# Patient Record
Sex: MALE | Race: WHITE | NOT HISPANIC OR LATINO | Employment: OTHER | URBAN - METROPOLITAN AREA
[De-identification: names, ages, dates, MRNs, and addresses within clinical notes are randomized per-mention and may not be internally consistent; named-entity substitution may affect disease eponyms.]

---

## 2017-07-05 ENCOUNTER — APPOINTMENT (OUTPATIENT)
Dept: RADIOLOGY | Facility: MEDICAL CENTER | Age: 77
End: 2017-07-05
Attending: EMERGENCY MEDICINE
Payer: MEDICARE

## 2017-07-05 ENCOUNTER — HOSPITAL ENCOUNTER (OUTPATIENT)
Facility: MEDICAL CENTER | Age: 77
End: 2017-07-06
Attending: EMERGENCY MEDICINE | Admitting: HOSPITALIST
Payer: MEDICARE

## 2017-07-05 DIAGNOSIS — R07.9 CHEST PAIN, UNSPECIFIED TYPE: ICD-10-CM

## 2017-07-05 LAB
ALBUMIN SERPL BCP-MCNC: 3.4 G/DL (ref 3.2–4.9)
ALBUMIN/GLOB SERPL: 1.2 G/DL
ALP SERPL-CCNC: 86 U/L (ref 30–99)
ALT SERPL-CCNC: 5 U/L (ref 2–50)
ANION GAP SERPL CALC-SCNC: 6 MMOL/L (ref 0–11.9)
APTT PPP: 29.3 SEC (ref 24.7–36)
AST SERPL-CCNC: 14 U/L (ref 12–45)
BASOPHILS # BLD AUTO: 0.3 % (ref 0–1.8)
BASOPHILS # BLD: 0.02 K/UL (ref 0–0.12)
BILIRUB SERPL-MCNC: 0.3 MG/DL (ref 0.1–1.5)
BNP SERPL-MCNC: 58 PG/ML (ref 0–100)
BUN SERPL-MCNC: 23 MG/DL (ref 8–22)
CALCIUM SERPL-MCNC: 8.4 MG/DL (ref 8.5–10.5)
CHLORIDE SERPL-SCNC: 110 MMOL/L (ref 96–112)
CO2 SERPL-SCNC: 20 MMOL/L (ref 20–33)
CREAT SERPL-MCNC: 1.34 MG/DL (ref 0.5–1.4)
EKG IMPRESSION: NORMAL
EOSINOPHIL # BLD AUTO: 0.52 K/UL (ref 0–0.51)
EOSINOPHIL NFR BLD: 8.2 % (ref 0–6.9)
ERYTHROCYTE [DISTWIDTH] IN BLOOD BY AUTOMATED COUNT: 47.9 FL (ref 35.9–50)
GFR SERPL CREATININE-BSD FRML MDRD: 52 ML/MIN/1.73 M 2
GLOBULIN SER CALC-MCNC: 2.8 G/DL (ref 1.9–3.5)
GLUCOSE SERPL-MCNC: 87 MG/DL (ref 65–99)
HCT VFR BLD AUTO: 33.3 % (ref 42–52)
HGB BLD-MCNC: 11.4 G/DL (ref 14–18)
IMM GRANULOCYTES # BLD AUTO: 0.03 K/UL (ref 0–0.11)
IMM GRANULOCYTES NFR BLD AUTO: 0.5 % (ref 0–0.9)
INR PPP: 1.3 (ref 0.87–1.13)
LIPASE SERPL-CCNC: 27 U/L (ref 11–82)
LYMPHOCYTES # BLD AUTO: 2.55 K/UL (ref 1–4.8)
LYMPHOCYTES NFR BLD: 40.1 % (ref 22–41)
MCH RBC QN AUTO: 32.9 PG (ref 27–33)
MCHC RBC AUTO-ENTMCNC: 34.2 G/DL (ref 33.7–35.3)
MCV RBC AUTO: 96 FL (ref 81.4–97.8)
MONOCYTES # BLD AUTO: 0.71 K/UL (ref 0–0.85)
MONOCYTES NFR BLD AUTO: 11.2 % (ref 0–13.4)
NEUTROPHILS # BLD AUTO: 2.53 K/UL (ref 1.82–7.42)
NEUTROPHILS NFR BLD: 39.7 % (ref 44–72)
NRBC # BLD AUTO: 0 K/UL
NRBC BLD AUTO-RTO: 0 /100 WBC
PLATELET # BLD AUTO: 135 K/UL (ref 164–446)
PMV BLD AUTO: 10.7 FL (ref 9–12.9)
POTASSIUM SERPL-SCNC: 4.1 MMOL/L (ref 3.6–5.5)
PROT SERPL-MCNC: 6.2 G/DL (ref 6–8.2)
PROTHROMBIN TIME: 16.6 SEC (ref 12–14.6)
RBC # BLD AUTO: 3.47 M/UL (ref 4.7–6.1)
SODIUM SERPL-SCNC: 136 MMOL/L (ref 135–145)
TROPONIN I SERPL-MCNC: <0.01 NG/ML (ref 0–0.04)
WBC # BLD AUTO: 6.4 K/UL (ref 4.8–10.8)

## 2017-07-05 PROCEDURE — 85025 COMPLETE CBC W/AUTO DIFF WBC: CPT

## 2017-07-05 PROCEDURE — 83880 ASSAY OF NATRIURETIC PEPTIDE: CPT

## 2017-07-05 PROCEDURE — 84484 ASSAY OF TROPONIN QUANT: CPT

## 2017-07-05 PROCEDURE — 99285 EMERGENCY DEPT VISIT HI MDM: CPT

## 2017-07-05 PROCEDURE — 71010 DX-CHEST-LIMITED (1 VIEW): CPT

## 2017-07-05 PROCEDURE — 80053 COMPREHEN METABOLIC PANEL: CPT

## 2017-07-05 PROCEDURE — 36415 COLL VENOUS BLD VENIPUNCTURE: CPT

## 2017-07-05 PROCEDURE — 85730 THROMBOPLASTIN TIME PARTIAL: CPT

## 2017-07-05 PROCEDURE — 83690 ASSAY OF LIPASE: CPT

## 2017-07-05 PROCEDURE — 93005 ELECTROCARDIOGRAM TRACING: CPT

## 2017-07-05 PROCEDURE — 85610 PROTHROMBIN TIME: CPT

## 2017-07-05 ASSESSMENT — LIFESTYLE VARIABLES: DO YOU DRINK ALCOHOL: NO

## 2017-07-05 ASSESSMENT — PAIN SCALES - GENERAL: PAINLEVEL_OUTOF10: 3

## 2017-07-05 NOTE — IP AVS SNAPSHOT
" Home Care Instructions                                                                                                                  Name:Naif Medina  Medical Record Number:[id number]  CSN: [id number]    YOB: 1940   Age: 76 y.o.  Sex: male  HT:1.778 m (5' 10\") WT: 65.1 kg (143 lb 8.3 oz)          Admit Date: 7/5/2017     Discharge Date:   Today's Date: 7/6/2017  Attending Doctor:  Dieter Bryan M.D.                  Allergies:  Heparin            Discharge Instructions       Discharge Instructions    Discharged to home by medical transportation with self. Discharged via wheelchair, hospital escort: Yes.  Special equipment needed: Not Applicable    Be sure to schedule a follow-up appointment with your primary care doctor or any specialists as instructed.     Discharge Plan:   Diet Plan: Discussed  Activity Level: Discussed  Smoking Cessation Offered: Patient Refused  Confirmed Follow up Appointment: Patient to Call and Schedule Appointment  Confirmed Symptoms Management: Discussed  Medication Reconciliation Updated: Yes  Influenza Vaccine Indication: Not indicated: Previously immunized this influenza season and > 8 years of age    I understand that a diet low in cholesterol, fat, and sodium is recommended for good health. Unless I have been given specific instructions below for another diet, I accept this instruction as my diet prescription.   Other diet:     Special Instructions: None    · Is patient discharged on Warfarin / Coumadin?   No     · Is patient Post Blood Transfusion?  No    Depression / Suicide Risk    As you are discharged from this [facility], it is important to learn how to keep safe from harming yourself.    Recognize the warning signs:  · Abrupt changes in personality, positive or negative- including increase in energy   · Giving away possessions  · Change in eating patterns- significant weight changes-  positive or negative  · Change in sleeping patterns- unable to " sleep or sleeping all the time   · Unwillingness or inability to communicate  · Depression  · Unusual sadness, discouragement and loneliness  · Talk of wanting to die  · Neglect of personal appearance   · Rebelliousness- reckless behavior  · Withdrawal from people/activities they love  · Confusion- inability to concentrate     If you or a loved one observes any of these behaviors or has concerns about self-harm, here's what you can do:  · Talk about it- your feelings and reasons for harming yourself  · Remove any means that you might use to hurt yourself (examples: pills, rope, extension cords, firearm)  · Get professional help from the community (Mental Health, Substance Abuse, psychological counseling)  · Do not be alone:Call your Safe Contact- someone whom you trust who will be there for you.  · Call your local CRISIS HOTLINE 111-8529 or 081-582-9668  · Call your local Children's Mobile Crisis Response Team [place] (419) 840-0487 or www.[employer]  · Call the toll free National Suicide Prevention Hotlines   · National Suicide Prevention Lifeline 054-195-MZLA (9603)  · National Hope Line Network 800-SUICIDE (211-9920)        Follow-up Information     1. Schedule an appointment as soon as possible for a visit with John Hilario M.D..    Specialty:  Internal Medicine    Contact information    [address] [place] [address] [phone]          2. [facility] Schedule an appointment as soon as possible for a visit in 1 week.         Discharge Medication Instructions:    Below are the medications your physician expects you to take upon discharge:    Review all your home medications and newly ordered medications with your doctor and/or pharmacist. Follow medication instructions as directed by your doctor and/or pharmacist.    Please keep your medication list with you and share with your physician.               Medication List      START taking these medications        Instructions    Morning  Afternoon Evening Bedtime    isosorbide mononitrate SR 30 MG Tb24   Commonly known as:  IMDUR        Take 1 Tab by mouth every morning.   Dose:  30 mg                          CONTINUE taking these medications        Instructions    Morning Afternoon Evening Bedtime    albuterol 108 (90 BASE) MCG/ACT Aers inhalation aerosol        Inhale 2 Puffs by mouth every 6 hours as needed for Shortness of Breath.   Dose:  2 Puff                        aspirin EC 81 MG Tbec   Last time this was given:  81 mg on 7/6/2017  9:18 AM   Commonly known as:  ECOTRIN        Take 81 mg by mouth every morning.   Dose:  81 mg                        busPIRone 5 MG Tabs   Last time this was given:  5 mg on 7/6/2017  9:18 AM   Commonly known as:  BUSPAR        Take 5 mg by mouth 2 Times a Day.   Dose:  5 mg                        carvedilol 6.25 MG Tabs   Last time this was given:  6.25 mg on 7/6/2017  9:18 AM   Commonly known as:  COREG        Take 6.25 mg by mouth 2 times a day, with meals.   Dose:  6.25 mg                        ibuprofen 400 MG Tabs   Commonly known as:  MOTRIN        Take 400 mg by mouth every morning.   Dose:  400 mg                        Iron 325 (65 FE) MG Tabs        Take 1 Tab by mouth 2 Times a Day.   Dose:  1 Tab                        lisinopril 20 MG Tabs   Last time this was given:  20 mg on 7/6/2017  9:18 AM   Commonly known as:  PRINIVIL        Take 20 mg by mouth 2 times a day.   Dose:  20 mg                        Magnesium Oxide 420 MG Tabs        Take 1 Tab by mouth every evening.   Dose:  1 Tab                        omeprazole 40 MG delayed-release capsule   Last time this was given:  40 mg on 7/6/2017  9:20 AM   Commonly known as:  PRILOSEC        Take 40 mg by mouth every morning.   Dose:  40 mg                        simvastatin 20 MG Tabs   Commonly known as:  ZOCOR        Take 20 mg by mouth every evening.   Dose:  20 mg                        therapeutic multivitamin-minerals Tabs        Take 1  Tab by mouth every day.   Dose:  1 Tab                             Where to Get Your Medications      You can get these medications from any pharmacy     Bring a paper prescription for each of these medications    - isosorbide mononitrate SR 30 MG Tb24            Instructions           Diet / Nutrition:    Follow any diet instructions given to you by your doctor or the dietician, including how much salt (sodium) you are allowed each day.    If you are overweight, talk to your doctor about a weight reduction plan.    Activity:    Remain physically active following your doctor's instructions about exercise and activity.    Rest often.     Any time you become even a little tired or short of breath, SIT DOWN and rest.    Worsening Symptoms:    Report any of the following signs and symptoms to the doctor's office immediately:    *Pain of jaw, arm, or neck  *Chest pain not relieved by medication                               *Dizziness or loss of consciousness  *Difficulty breathing even when at rest   *More tired than usual                                       *Bleeding drainage or swelling of surgical site  *Swelling of feet, ankles, legs or stomach                 *Fever (>100ºF)  *Pink or blood tinged sputum  *Weight gain (3lbs/day or 5lbs /week)           *Shock from internal defibrillator (if applicable)  *Palpitations or irregular heartbeats                *Cool and/or numb extremities    Stroke Awareness    Common Risk Factors for Stroke include:    Age  Atrial Fibrillation  Carotid Artery Stenosis  Diabetes Mellitus  Excessive alcohol consumption  High blood pressure  Overweight   Physical inactivity  Smoking    Warning signs and symptoms of a stroke include:    *Sudden numbness or weakness of the face, arm or leg (especially on one side of the body).  *Sudden confusion, trouble speaking or understanding.  *Sudden trouble seeing in one or both eyes.  *Sudden trouble walking, dizziness, loss of balance or  coordination.Sudden severe headache with no known cause.    It is very important to get treatment quickly when a stroke occurs. If you experience any of the above warning signs, call 911 immediately.                   Disclaimer         Quit Smoking / Tobacco Use:    I understand the use of any tobacco products increases my chance of suffering from future heart disease or stroke and could cause other illnesses which may shorten my life. Quitting the use of tobacco products is the single most important thing I can do to improve my health. For further information on smoking / tobacco cessation call a Toll Free Quit Line at 1-645.421.2308 (*National Cancer [place]) or 1-924.502.6751 (American Lung Association) or you can access the web based program at www.[employer]org.    [place] Tobacco Users Help Line:  (457) 284-6625       Toll Free: 1-129.515.9616  Quit Tobacco Program [facility] (481)998-5611    Crisis Hotline:    [place] Crisis Hotline:  1-759-KCOLWJU or 1-450.307.7796    [place] Crisis Hotline:    1-389.408.3333 or 498-599-1705    Discharge Survey:   Thank you for choosing [facility]. We hope we did everything we could to make your hospital stay a pleasant one. You may be receiving a phone survey and we would appreciate your time and participation in answering the questions. Your input is very valuable to us in our efforts to improve our service to our patients and their families.        My signature on this form indicates that:    1. I have reviewed and understand the above information.  2. My questions regarding this information have been answered to my satisfaction.  3. I have formulated a plan with my discharge nurse to obtain my prescribed medications for home.                  Disclaimer         __________________________________                     __________       ________                       Patient Signature                                                 Date                     Time

## 2017-07-06 ENCOUNTER — PATIENT OUTREACH (OUTPATIENT)
Dept: HEALTH INFORMATION MANAGEMENT | Facility: OTHER | Age: 77
End: 2017-07-06

## 2017-07-06 ENCOUNTER — RESOLUTE PROFESSIONAL BILLING HOSPITAL PROF FEE (OUTPATIENT)
Dept: HOSPITALIST | Facility: MEDICAL CENTER | Age: 77
End: 2017-07-06
Payer: MEDICARE

## 2017-07-06 VITALS
TEMPERATURE: 96.6 F | DIASTOLIC BLOOD PRESSURE: 62 MMHG | BODY MASS INDEX: 20.55 KG/M2 | HEART RATE: 53 BPM | WEIGHT: 143.52 LBS | RESPIRATION RATE: 16 BRPM | OXYGEN SATURATION: 97 % | HEIGHT: 70 IN | SYSTOLIC BLOOD PRESSURE: 149 MMHG

## 2017-07-06 PROBLEM — R07.9 CHEST PAIN: Status: ACTIVE | Noted: 2017-07-06

## 2017-07-06 LAB
ANION GAP SERPL CALC-SCNC: 6 MMOL/L (ref 0–11.9)
BUN SERPL-MCNC: 23 MG/DL (ref 8–22)
CALCIUM SERPL-MCNC: 8.4 MG/DL (ref 8.5–10.5)
CHLORIDE SERPL-SCNC: 111 MMOL/L (ref 96–112)
CO2 SERPL-SCNC: 19 MMOL/L (ref 20–33)
CREAT SERPL-MCNC: 1.29 MG/DL (ref 0.5–1.4)
EKG IMPRESSION: NORMAL
EKG IMPRESSION: NORMAL
GFR SERPL CREATININE-BSD FRML MDRD: 54 ML/MIN/1.73 M 2
GLUCOSE SERPL-MCNC: 86 MG/DL (ref 65–99)
POTASSIUM SERPL-SCNC: 4.7 MMOL/L (ref 3.6–5.5)
SODIUM SERPL-SCNC: 136 MMOL/L (ref 135–145)
TROPONIN I SERPL-MCNC: <0.01 NG/ML (ref 0–0.04)
TROPONIN I SERPL-MCNC: <0.01 NG/ML (ref 0–0.04)

## 2017-07-06 PROCEDURE — 700102 HCHG RX REV CODE 250 W/ 637 OVERRIDE(OP): Performed by: HOSPITALIST

## 2017-07-06 PROCEDURE — 700111 HCHG RX REV CODE 636 W/ 250 OVERRIDE (IP): Performed by: HOSPITALIST

## 2017-07-06 PROCEDURE — 93010 ELECTROCARDIOGRAM REPORT: CPT | Mod: 76 | Performed by: INTERNAL MEDICINE

## 2017-07-06 PROCEDURE — G0378 HOSPITAL OBSERVATION PER HR: HCPCS

## 2017-07-06 PROCEDURE — 84484 ASSAY OF TROPONIN QUANT: CPT

## 2017-07-06 PROCEDURE — 93005 ELECTROCARDIOGRAM TRACING: CPT | Performed by: HOSPITALIST

## 2017-07-06 PROCEDURE — A9270 NON-COVERED ITEM OR SERVICE: HCPCS | Performed by: HOSPITALIST

## 2017-07-06 PROCEDURE — 96372 THER/PROPH/DIAG INJ SC/IM: CPT

## 2017-07-06 PROCEDURE — 99235 HOSP IP/OBS SAME DATE MOD 70: CPT | Performed by: HOSPITALIST

## 2017-07-06 PROCEDURE — A9270 NON-COVERED ITEM OR SERVICE: HCPCS | Performed by: NURSE PRACTITIONER

## 2017-07-06 PROCEDURE — 36415 COLL VENOUS BLD VENIPUNCTURE: CPT

## 2017-07-06 PROCEDURE — 80048 BASIC METABOLIC PNL TOTAL CA: CPT

## 2017-07-06 PROCEDURE — 700102 HCHG RX REV CODE 250 W/ 637 OVERRIDE(OP): Performed by: NURSE PRACTITIONER

## 2017-07-06 RX ORDER — BISACODYL 10 MG
10 SUPPOSITORY, RECTAL RECTAL
Status: DISCONTINUED | OUTPATIENT
Start: 2017-07-06 | End: 2017-07-06 | Stop reason: HOSPADM

## 2017-07-06 RX ORDER — RANOLAZINE 500 MG/1
500 TABLET, EXTENDED RELEASE ORAL 2 TIMES DAILY
Status: DISCONTINUED | OUTPATIENT
Start: 2017-07-06 | End: 2017-07-06 | Stop reason: HOSPADM

## 2017-07-06 RX ORDER — ONDANSETRON 4 MG/1
4 TABLET, ORALLY DISINTEGRATING ORAL EVERY 4 HOURS PRN
Status: DISCONTINUED | OUTPATIENT
Start: 2017-07-06 | End: 2017-07-06 | Stop reason: HOSPADM

## 2017-07-06 RX ORDER — SIMVASTATIN 40 MG
20 TABLET ORAL NIGHTLY
Status: DISCONTINUED | OUTPATIENT
Start: 2017-07-06 | End: 2017-07-06

## 2017-07-06 RX ORDER — AMOXICILLIN 250 MG
2 CAPSULE ORAL 2 TIMES DAILY
Status: DISCONTINUED | OUTPATIENT
Start: 2017-07-06 | End: 2017-07-06 | Stop reason: HOSPADM

## 2017-07-06 RX ORDER — POLYETHYLENE GLYCOL 3350 17 G/17G
1 POWDER, FOR SOLUTION ORAL
Status: DISCONTINUED | OUTPATIENT
Start: 2017-07-06 | End: 2017-07-06 | Stop reason: HOSPADM

## 2017-07-06 RX ORDER — LABETALOL HYDROCHLORIDE 5 MG/ML
10 INJECTION, SOLUTION INTRAVENOUS EVERY 4 HOURS PRN
Status: DISCONTINUED | OUTPATIENT
Start: 2017-07-06 | End: 2017-07-06 | Stop reason: HOSPADM

## 2017-07-06 RX ORDER — DIPHENHYDRAMINE HCL 25 MG
50 CAPSULE ORAL NIGHTLY PRN
Qty: 60 CAP | Refills: 1 | Status: SHIPPED | OUTPATIENT
Start: 2017-07-06 | End: 2022-01-01

## 2017-07-06 RX ORDER — ISOSORBIDE MONONITRATE 30 MG/1
30 TABLET, EXTENDED RELEASE ORAL EVERY MORNING
Qty: 30 TAB | Refills: 3 | Status: SHIPPED | OUTPATIENT
Start: 2017-07-06 | End: 2022-01-01

## 2017-07-06 RX ORDER — OMEPRAZOLE 20 MG/1
40 CAPSULE, DELAYED RELEASE ORAL EVERY MORNING
Status: DISCONTINUED | OUTPATIENT
Start: 2017-07-06 | End: 2017-07-06 | Stop reason: HOSPADM

## 2017-07-06 RX ORDER — ACETAMINOPHEN 325 MG/1
650 TABLET ORAL EVERY 6 HOURS PRN
Status: DISCONTINUED | OUTPATIENT
Start: 2017-07-06 | End: 2017-07-06 | Stop reason: HOSPADM

## 2017-07-06 RX ORDER — SIMVASTATIN 20 MG
20 TABLET ORAL NIGHTLY
Status: DISCONTINUED | OUTPATIENT
Start: 2017-07-06 | End: 2017-07-06 | Stop reason: HOSPADM

## 2017-07-06 RX ORDER — ONDANSETRON 2 MG/ML
4 INJECTION INTRAMUSCULAR; INTRAVENOUS EVERY 4 HOURS PRN
Status: DISCONTINUED | OUTPATIENT
Start: 2017-07-06 | End: 2017-07-06 | Stop reason: HOSPADM

## 2017-07-06 RX ORDER — LISINOPRIL 20 MG/1
20 TABLET ORAL 2 TIMES DAILY
Status: DISCONTINUED | OUTPATIENT
Start: 2017-07-06 | End: 2017-07-06 | Stop reason: HOSPADM

## 2017-07-06 RX ORDER — PNV NO.95/FERROUS FUM/FOLIC AC 28MG-0.8MG
1 TABLET ORAL 2 TIMES DAILY
Status: SHIPPED | COMMUNITY
End: 2022-01-01

## 2017-07-06 RX ORDER — BUSPIRONE HYDROCHLORIDE 5 MG/1
5 TABLET ORAL 2 TIMES DAILY
Status: DISCONTINUED | OUTPATIENT
Start: 2017-07-06 | End: 2017-07-06 | Stop reason: HOSPADM

## 2017-07-06 RX ORDER — CARVEDILOL 6.25 MG/1
6.25 TABLET ORAL 2 TIMES DAILY WITH MEALS
Status: DISCONTINUED | OUTPATIENT
Start: 2017-07-06 | End: 2017-07-06 | Stop reason: HOSPADM

## 2017-07-06 RX ADMIN — RANOLAZINE 500 MG: 500 TABLET, FILM COATED, EXTENDED RELEASE ORAL at 12:07

## 2017-07-06 RX ADMIN — CARVEDILOL 6.25 MG: 6.25 TABLET, FILM COATED ORAL at 09:18

## 2017-07-06 RX ADMIN — ASPIRIN 81 MG: 81 TABLET ORAL at 09:18

## 2017-07-06 RX ADMIN — ENOXAPARIN SODIUM 40 MG: 100 INJECTION SUBCUTANEOUS at 09:19

## 2017-07-06 RX ADMIN — LISINOPRIL 20 MG: 20 TABLET ORAL at 09:18

## 2017-07-06 RX ADMIN — BUSPIRONE HYDROCHLORIDE 5 MG: 5 TABLET ORAL at 09:18

## 2017-07-06 RX ADMIN — OMEPRAZOLE 40 MG: 20 CAPSULE, DELAYED RELEASE ORAL at 09:20

## 2017-07-06 ASSESSMENT — ENCOUNTER SYMPTOMS
COUGH: 0
CONSTIPATION: 0
NAUSEA: 0
WEAKNESS: 0
DIZZINESS: 0
HEADACHES: 0
MYALGIAS: 0
SHORTNESS OF BREATH: 0
VOMITING: 0
FEVER: 0
PALPITATIONS: 0
CHILLS: 0
ABDOMINAL PAIN: 0
DIARRHEA: 0
FOCAL WEAKNESS: 0

## 2017-07-06 ASSESSMENT — LIFESTYLE VARIABLES
EVER_SMOKED: YES
ALCOHOL_USE: NO

## 2017-07-06 ASSESSMENT — PAIN SCALES - GENERAL
PAINLEVEL_OUTOF10: 0
PAINLEVEL_OUTOF10: 0

## 2017-07-06 NOTE — CARE PLAN
Problem: Safety  Goal: Free from accidental injury  Outcome: PROGRESSING AS EXPECTED  Pt calls for needs before getting OOB. Bed alarm on.    Problem: Pain  Goal: Alleviation of Pain or a reduction in pain to the patient’s comfort goal  Outcome: PROGRESSING AS EXPECTED  Pt has had no c/o CP since admit to floor.

## 2017-07-06 NOTE — H&P
" Hospital Medicine History and Physical    Date of Service  7/6/2017    Chief Complaint  Chief Complaint   Patient presents with   • Chest Pain       History of Presenting Illness  76 y.o. male admitted 7/5/2017 with chest pain that he describes as \"a round thing in my chest\" and he is really able to give no better history than this. Apparently it started this morning around 5:30, then he took a nitro and went back to sleep with no apparent improvement; this did not stop him, however, from cleaning his house this afternoon and no having any chest pain then. He had a recurrence around 6 pm, took a nitro without relief and decided to call 911.    [facility] but he is not sure when he last saw them.    He had a normal stress test here in 2015 and a prior cardiology consultation in 2013 here with no ability to intervene for PCI due to tortuosity and at that time medical management was recommended.     Primary Care Physician  John Hilario M.D.    [facility]    Code Status  Full    Review of Systems  Review of Systems   Constitutional: Negative for fever and chills.   Respiratory: Negative for cough and shortness of breath.    Cardiovascular: Positive for chest pain. Negative for palpitations.   Gastrointestinal: Negative for nausea, vomiting, abdominal pain, diarrhea and constipation.   Genitourinary: Negative for dysuria.   Musculoskeletal: Negative for myalgias.   Skin: Negative for itching.   Neurological: Negative for dizziness, focal weakness, weakness and headaches.   All other systems reviewed and are negative.       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 at ENDOSCOPY [facility]S   • Colonoscopy - endo  3/22/2013     Performed by Edson Mccoy M.D. at ENDOSCOPY [facility] ORS   • Cardiac cath     • Angioplasty         Medications  No current facility-administered medications on file prior to encounter.     Current Outpatient Prescriptions on File Prior to Encounter   Medication Sig Dispense Refill   • albuterol (VENTOLIN OR PROVENTIL) 108 (90 BASE) MCG/ACT Aero Soln inhalation aerosol Inhale 2 Puffs by mouth every 6 hours as needed for Shortness of Breath.     • aspirin EC (ECOTRIN) 81 MG Tablet Delayed Response Take 81 mg by mouth every morning.     • busPIRone (BUSPAR) 5 MG Tab Take 5 mg by mouth 2 Times a Day.     • carvedilol (COREG) 6.25 MG Tab Take 6.25 mg by mouth 2 times a day, with meals.     • ibuprofen (MOTRIN) 400 MG Tab Take 400 mg by mouth every morning.     • lisinopril (PRINIVIL) 20 MG Tab Take 20 mg by mouth 2 times a day.     • Magnesium Oxide 420 MG Tab Take 1 Tab by mouth every evening.     • therapeutic multivitamin-minerals (THERAGRAN-M) Tab Take 1 Tab by mouth every day.     • omeprazole (PRILOSEC) 40 MG delayed-release capsule Take 40 mg by mouth every morning.     • simvastatin (ZOCOR) 20 MG Tab Take 20 mg by mouth every evening.         Family History  Family History   Problem Relation Age of Onset   • Heart Attack Father        Social History  Social History   Substance Use Topics   • Smoking status: Current Every Day Smoker -- 1.00 packs/day     Types: Cigarettes   • Smokeless tobacco: Not on file   • Alcohol Use: Yes      Comment: \"beer occasionally during sports\"       Allergies  Allergies   Allergen Reactions   • Heparin Unspecified     [facility]ical=[facility]; pt confirmed but can't recall the rxn.        Physical Exam  Laboratory/Imaging   Hemodynamics  Temp (24hrs), Av.9 °C " (98.4 °F), Min:36.9 °C (98.4 °F), Max:36.9 °C (98.4 °F)   Temperature: 36.9 °C (98.4 °F)  Pulse  Av.7  Min: 51  Max: 58 Heart Rate (Monitored): (!) 56  Blood Pressure : 119/44 mmHg, NIBP: 110/44 mmHg      Respiratory      Respiration: 19, Pulse Oximetry: 95 %             Physical Exam   Constitutional: He is oriented to person, place, and time. He appears well-developed and well-nourished.   Elderly and frail   HENT:   Head: Normocephalic and atraumatic.   Mouth/Throat: Oropharynx is clear and moist.   Dentures   Eyes: Conjunctivae and EOM are normal. Pupils are equal, round, and reactive to light. No scleral icterus.   Neck: Normal range of motion. Neck supple. No tracheal deviation present. No thyromegaly present.   Cardiovascular: Normal rate, regular rhythm, normal heart sounds and intact distal pulses.   Occasional extrasystoles are present.   No murmur heard.  Pulmonary/Chest: Effort normal and breath sounds normal. No respiratory distress. He has no wheezes.   Abdominal: Soft. Bowel sounds are normal. He exhibits no distension. There is no tenderness.   Musculoskeletal: Normal range of motion. He exhibits no edema or tenderness.   Lymphadenopathy:     He has no cervical adenopathy.        Right: No supraclavicular adenopathy present.        Left: No supraclavicular adenopathy present.   Neurological: He is alert and oriented to person, place, and time. No cranial nerve deficit.   Mumbly speech   Skin: Skin is warm and dry.   Vitals reviewed.      Recent Labs      17   WBC  6.4   RBC  3.47*   HEMOGLOBIN  11.4*   HEMATOCRIT  33.3*   MCV  96.0   MCH  32.9   MCHC  34.2   RDW  47.9   PLATELETCT  135*   MPV  10.7     Recent Labs      17   SODIUM  136   POTASSIUM  4.1   CHLORIDE  110   CO2  20   GLUCOSE  87   BUN  23*   CREATININE  1.34   CALCIUM  8.4*     Recent Labs      17   APTT  29.3   INR  1.30*     Recent Labs      17   BNPBTYPENAT  58               Assessment/Plan     Chest pain (present on admission)  Assessment & Plan  Likely non-cardiac  Cycle troponins, will hold of on stress now for now  +/- cardiology in the morning, likely can [facility] as patient wants to go home as soon as possible.    CAD (coronary artery disease) (present on admission)  Assessment & Plan  ACE, statin, BB, ASA    Hypertension (present on admission)  Assessment & Plan  SBP goal less than 140 mmHg  DBP goal less than 90 mmHg  PRN and antihypertensives to titrate by hospitalist towards goal  Most recent Blood Pressure : 119/44 mmHg     COPD (chronic obstructive pulmonary disease) (CMS-HCC) (present on admission)  Assessment & Plan  Stable, no home O2.    History of cerebrovascular accident (CVA) with residual deficit, slurred speech (present on admission)  Assessment & Plan  ASA/statin for secondary ppx    Tremor (present on admission)  Assessment & Plan  Stable, walks with 4-wheel walker.         VTE prophylaxis with Lovenox.

## 2017-07-06 NOTE — ED NOTES
Called report to Yuliya MARIO who is to assume care of pt on T216. All questions/concerns addressed at this time. Pt updated on room assignment and wait times for transport.

## 2017-07-06 NOTE — PROGRESS NOTES
Assumed care @ 0715. Bedside report from FABIANO Dwyer. Pt awake, resting in bed and in no distress. Bed in low position, call light w/in reach.

## 2017-07-06 NOTE — ED NOTES
ERP at bedside for re-eval. Pt resting comfortably on gurney. Pt with no changes from previous assessments. Respirations are even and unlabored. Bed in lowest position, call light within reach. Pt with no further needs at this time.

## 2017-07-06 NOTE — DISCHARGE SUMMARY
"CHIEF COMPLAINT ON ADMISSION  Chief Complaint   Patient presents with   • Chest Pain       CODE STATUS  Full Code    HPI & HOSPITAL COURSE  This is a 76 y.o. male here with chest pain that he describes as \"a round thing in my chest\".. Apparently it started in the morning around 5:30, then he took a nitro and went back to sleep with no apparent improvement; this did not stop him, however, from cleaning his house this afternoon and no having any chest pain then. He had a recurrence around 6 pm, took a nitro without relief and decided to call 911. [facility] but he is not sure when he last saw them.He had a normal stress test here in 2015 and a prior cardiology consultation in 2013 here with no ability to intervene for PCI due to tortuosity and at that time medical management was recommended. He was monitored on telemetry, and serial troponins and EKGs were done with no significant findings. We will start him on Imdur and want him to follow up with his primary and [facility]. He had no further chest pain at discharge.    Therefore, he is discharged in good and stable condition with close outpatient follow-up.    SPECIFIC OUTPATIENT FOLLOW-UP  [facility] within 1-2 weeks    DISCHARGE PROBLEM LIST  Active Problems:    Chest pain POA: Yes    CAD (coronary artery disease) (Chronic) POA: Yes    Hypertension (Chronic) POA: Yes    COPD (chronic obstructive pulmonary disease) (CMS-HCC) (Chronic) POA: Yes    History of cerebrovascular accident (CVA) with residual deficit, slurred speech (Chronic) POA: Yes    Tremor (Chronic) POA: Yes  Resolved Problems:    * No resolved hospital problems. *      FOLLOW UP  No future appointments.  John Hilario M.D.  [address] [place] [address]  [phone]    Schedule an appointment as soon as possible for a visit      [facility]    Schedule an appointment as soon as possible for a visit in 1 week        MEDICATIONS ON DISCHARGE   Naif Medina "   Home Medication Instructions JULIA:[id number]    Printed on:07/06/17 1173   Medication Information                      albuterol (VENTOLIN OR PROVENTIL) 108 (90 BASE) MCG/ACT Aero Soln inhalation aerosol  Inhale 2 Puffs by mouth every 6 hours as needed for Shortness of Breath.             aspirin EC (ECOTRIN) 81 MG Tablet Delayed Response  Take 81 mg by mouth every morning.             busPIRone (BUSPAR) 5 MG Tab  Take 5 mg by mouth 2 Times a Day.             carvedilol (COREG) 6.25 MG Tab  Take 6.25 mg by mouth 2 times a day, with meals.             Ferrous Sulfate (IRON) 325 (65 FE) MG Tab  Take 1 Tab by mouth 2 Times a Day.             ibuprofen (MOTRIN) 400 MG Tab  Take 400 mg by mouth every morning.             isosorbide mononitrate SR (IMDUR) 30 MG TABLET SR 24 HR  Take 1 Tab by mouth every morning.             lisinopril (PRINIVIL) 20 MG Tab  Take 20 mg by mouth 2 times a day.             Magnesium Oxide 420 MG Tab  Take 1 Tab by mouth every evening.             omeprazole (PRILOSEC) 40 MG delayed-release capsule  Take 40 mg by mouth every morning.             simvastatin (ZOCOR) 20 MG Tab  Take 20 mg by mouth every evening.             therapeutic multivitamin-minerals (THERAGRAN-M) Tab  Take 1 Tab by mouth every day.                 DIET  Orders Placed This Encounter   Procedures   • Diet Order     Standing Status: Standing      Number of Occurrences: 1      Standing Expiration Date:      Order Specific Question:  Diet:     Answer:  2 Gram Sodium [7]       ACTIVITY  As tolerated.  Weight bearing as tolerated      CONSULTATIONS  None    PROCEDURES  None    LABORATORY  Lab Results   Component Value Date/Time    SODIUM 136 07/06/2017 03:34 AM    POTASSIUM 4.7 07/06/2017 03:34 AM    CHLORIDE 111 07/06/2017 03:34 AM    CO2 19* 07/06/2017 03:34 AM    GLUCOSE 86 07/06/2017 03:34 AM    BUN 23* 07/06/2017 03:34 AM    CREATININE 1.29 07/06/2017 03:34 AM        Lab Results   Component Value Date/Time    WBC 6.4  07/05/2017 10:33 PM    HEMOGLOBIN 11.4* 07/05/2017 10:33 PM    HEMATOCRIT 33.3* 07/05/2017 10:33 PM    PLATELET COUNT 135* 07/05/2017 10:33 PM        Total time of the discharge process exceeds 36 minutes

## 2017-07-06 NOTE — PROGRESS NOTES
Pt states he would prefer to talk to [facility] about PNA vaccine and is up to date on flu vaccine.

## 2017-07-06 NOTE — ED PROVIDER NOTES
"ED Provider Note    CHIEF COMPLAINT  Chief Complaint   Patient presents with   • Chest Pain       HPI  Naif Medina is a 76 y.o. Male here for evaluation of chest pain. Patient states that he had an episode of chest pain earlier this morning at about 6 AM, and states that he took a nitroglycerin and it went away. The patient had resolution of his chest pain, but as the day went on and became more nervous that he was missing something. He denies any shortness of breath or diaphoresis, or nausea/vomiting. He did call the paramedics secondary 20 to be evaluated, and they gave him 4 baby aspirin and encouraged him to come in. At this time he is pain-free.     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    SOCIAL HISTORY  Social History     Social History Main Topics   • Smoking status: Current Every Day Smoker -- 1.00 packs/day     Types: Cigarettes   • Smokeless tobacco: Not on file   • Alcohol Use: Yes      Comment: \"beer occasionally during sports\"   • Drug Use: No   • Sexual Activity: Not on file       SURGICAL HISTORY   has past surgical history that includes appendectomy; gastroscopy-endo (3/22/2013); colonoscopy - endo (3/22/2013); cardiac cath; and angioplasty.    CURRENT MEDICATIONS  Home Medications     **Home medications have not yet been reviewed for this encounter**          ALLERGIES  Allergies   Allergen Reactions   • Heparin Unspecified     [facility]; [facility] confirmed but can't recall the rxn.       REVIEW OF SYSTEMS  See HPI for further details. Review of systems as above, otherwise all other systems are negative.     PHYSICAL EXAM  VITAL SIGNS: /44 mmHg  Pulse 58  Temp(Src) 36.9 °C (98.4 °F)  Resp " "18  Ht 1.778 m (5' 10\")  Wt 65.772 kg (145 lb)  BMI 20.81 kg/m2  SpO2 95%    Constitutional: Well appearing patient.  Not toxic, nor ill in appearance.  HEENT: NC/AT.  Extra Ocular Muscles Intact. Posterior pharynx clear, and without exudate. No uvula edema.  Neck: Full range of motion; non tender. no meningismus.  Cardiovascular: Regular heart rate and rhythm.  No murmurs, rubs, nor gallop appreciated.   Back:  Non tender midline.  No obvious step off or deformity.  Thorax & Lungs: Lungs are clear to auscultation with good air movement bilaterally.  No wheeze, rhonchi, nor rales.   Abdomen: Soft, with no tenderness, rebound nor guarding.  No mass, pulsatile mass, nor hepatosplenomegaly appreciated.  Skin: No purpura nor petechia noted.  No rash.  Extremities/Musculoskeletal: No sign of trauma.    Musculoskeletal: Range of motion is intact in all major joints.  Neurologic: Alert & oriented x 3.  CN II-XII grossly intact.   Strength and sensation is intact. Clear speech, appropriate, cooperative.  Psychiatric: Normal affect appropriate for the clinical situation.    Results for orders placed or performed during the hospital encounter of 07/05/17   Troponin   Result Value Ref Range    Troponin I <0.01 0.00 - 0.04 ng/mL   Btype Natriuretic Peptide   Result Value Ref Range    B Natriuretic Peptide 58 0 - 100 pg/mL   CBC with Differential   Result Value Ref Range    WBC 6.4 4.8 - 10.8 K/uL    RBC 3.47 (L) 4.70 - 6.10 M/uL    Hemoglobin 11.4 (L) 14.0 - 18.0 g/dL    Hematocrit 33.3 (L) 42.0 - 52.0 %    MCV 96.0 81.4 - 97.8 fL    MCH 32.9 27.0 - 33.0 pg    MCHC 34.2 33.7 - 35.3 g/dL    RDW 47.9 35.9 - 50.0 fL    Platelet Count 135 (L) 164 - 446 K/uL    MPV 10.7 9.0 - 12.9 fL    Neutrophils-Polys 39.70 (L) 44.00 - 72.00 %    Lymphocytes 40.10 22.00 - 41.00 %    Monocytes 11.20 0.00 - 13.40 %    Eosinophils 8.20 (H) 0.00 - 6.90 %    Basophils 0.30 0.00 - 1.80 %    Immature Granulocytes 0.50 0.00 - 0.90 %    Nucleated RBC " 0.00 /100 WBC    Neutrophils (Absolute) 2.53 1.82 - 7.42 K/uL    Lymphs (Absolute) 2.55 1.00 - 4.80 K/uL    Monos (Absolute) 0.71 0.00 - 0.85 K/uL    Eos (Absolute) 0.52 (H) 0.00 - 0.51 K/uL    Baso (Absolute) 0.02 0.00 - 0.12 K/uL    Immature Granulocytes (abs) 0.03 0.00 - 0.11 K/uL    NRBC (Absolute) 0.00 K/uL   Complete Metabolic Panel (CMP)   Result Value Ref Range    Sodium 136 135 - 145 mmol/L    Potassium 4.1 3.6 - 5.5 mmol/L    Chloride 110 96 - 112 mmol/L    Co2 20 20 - 33 mmol/L    Anion Gap 6.0 0.0 - 11.9    Glucose 87 65 - 99 mg/dL    Bun 23 (H) 8 - 22 mg/dL    Creatinine 1.34 0.50 - 1.40 mg/dL    Calcium 8.4 (L) 8.5 - 10.5 mg/dL    AST(SGOT) 14 12 - 45 U/L    ALT(SGPT) 5 2 - 50 U/L    Alkaline Phosphatase 86 30 - 99 U/L    Total Bilirubin 0.3 0.1 - 1.5 mg/dL    Albumin 3.4 3.2 - 4.9 g/dL    Total Protein 6.2 6.0 - 8.2 g/dL    Globulin 2.8 1.9 - 3.5 g/dL    A-G Ratio 1.2 g/dL   Prothrombin Time   Result Value Ref Range    PT 16.6 (H) 12.0 - 14.6 sec    INR 1.30 (H) 0.87 - 1.13   APTT   Result Value Ref Range    APTT 29.3 24.7 - 36.0 sec   Lipase   Result Value Ref Range    Lipase 27 11 - 82 U/L   ESTIMATED GFR   Result Value Ref Range    GFR If African American >60 >60 mL/min/1.73 m 2    GFR If Non African American 52 (A) >60 mL/min/1.73 m 2   EKG (ER)   Result Value Ref Range    Report       [facility] Emergency Dept.    Test Date:  2017  Pt Name:    GALEN CAMPBELL               Department: ER  MRN:        [id number]                      Room:       RD 01  Gender:     M                            Technician: [id number]  :        [id number]                   Requested By:ER TRIAGE PROTOCOL  Order #:    [id number]                    Reading MD:    Measurements  Intervals                                Axis  Rate:       56                           P:          51  WA:         228                          QRS:        -43  QRSD:       152                          T:          45  QT:          496  QTc:        479    Interpretive Statements  SINUS BRADYCARDIA  FIRST DEGREE AV BLOCK  IVCD, CONSIDER ATYPICAL RBBB  Compared to ECG 01/14/2015 08:58:00  First degree AV block now present  Sinus rhythm no longer present  ST (T wave) deviation no longer present  Possible ischemia no longer present       DX-CHEST-LIMITED (1 VIEW)   Final Result      No acute cardiopulmonary disease.            PROCEDURES     MEDICAL RECORD  I have reviewed patient's medical record and pertinent results are listed above.    COURSE & MEDICAL DECISION MAKING  I have reviewed any medical record information, laboratory studies and radiographic results as noted above.    11:51 PM  The pt is chest pain free at this time.  He agrees to stay as a pt, and should be admitted secondary to his lengthy history of cardiac issues, and his last cardiac stress being two years ago.    Dr. Bang will admit the pt.    Differential diagnoses include but not limited to: mi, pe, ptx,    FINAL IMPRESSION  1. Chest pain, unspecified type            Electronically signed by: Tim Mcpherson, 7/5/2017 11:13 PM

## 2017-07-06 NOTE — DISCHARGE PLANNING
Met w/ pt to discuss dc planning, pt states that Mar 2016 he was placed in a recovery program for ETOH his last drink of ETOH was at this time, from that he [facility], he has meals on wheels, and the SW has caregivers that assist him w/ laundry, housekeeping and groceries.  Pt utilizes [facility] for appt's and states any other excursions he uses the bus.  He states that using the bus is difficult for him due to ambulating to the stop then ambulating after he gets to where he's going.  He states that he is good for ambulation for about 1/2 a block.  He states he does have a scooter but it is not been working for long distances and dies quickly, he told the CM that he has been afraid to take it out as he doesn't know how he would get it home if it  while he was out.  CM asked pt if he used the MTM w/ his medicaid, he stated that he had heard about the rides but no one has given him any information on how to access the rides.  CM gave pt the [employer]TM print out for medicaid rides. Upon discharge will need assistance with setting up ride through MTM.      Care Transition Team Assessment    Information Source  Orientation : Oriented x 4  Information Given By: Patient  Who is responsible for making decisions for patient? : Patient         Elopement Risk  Legal Hold: No  Ambulatory or Self Mobile in Wheelchair: No-Not an Elopement Risk    Interdisciplinary Discharge Planning  Does Admitting Nurse Feel This Could be a Complex Discharge?: No  [facility]  Lives with - Patient's Self Care Capacity: Alone and Able to Care For Self  Support Systems:  /   Housing / Facility: 1 Story Apartment / Condo  Able to Return to Previous ADL's: Yes  Mobility Issues: Yes  Prior Services: Meals on Wheels, Aging Services, Housekeeping / Homemaker Services  Patient Expects to be Discharged to:: home  Assistance Needed: Yes  Durable Medical Equipment:  Walker, Other - Specify (scooter)    Discharge Preparedness  What is your plan after discharge?: Home with help  What are your discharge supports?: Other (comment) ( )  Prior Functional Level: Ambulatory, Needs Assist with Activities of Daily Living, Uses Walker, Other (Comments) (ambulatory for short distances)    Functional Assesment  Prior Functional Level: Ambulatory, Needs Assist with Activities of Daily Living, Uses Walker, Other (Comments) (ambulatory for short distances)    Finances  Financial Barriers to Discharge: No    Vision / Hearing Impairment  Vision Impairment : Yes  Right Eye Vision: Wears Glasses  Left Eye Vision: Wears Glasses              Domestic Abuse  Have you ever been the victim of abuse or violence?: No    Psychological Assessment  History of Substance Abuse: Alcohol  Date Last Used - Alcohol: 3/2016  Substance Abuse Comments:  (has gone through recovery program 2016)    Discharge Risks or Barriers  Discharge risks or barriers?: Transportation    Anticipated Discharge Information  Anticipated discharge disposition: Home  Discharge Address: [address] [place] [address]

## 2017-07-06 NOTE — PROGRESS NOTES
Pt arrived via gurney w/ transport. Pt ambulated x1 assist w/ FWW. Tele monitor placed on pt. Pt resting comfortably in bed.

## 2017-07-06 NOTE — PROGRESS NOTES
Assessment complete. A&Ox4. C/o pain 0/10. Will be medicated per [facility] prn. Discussed POC w/ pt. Discussed the importance of calling for needs and before getting OOB to prevent injury/falls. Pt verbalizes understanding. Also discussed to have pt call if he has increases or changes in CP. Pt verbalizes understanding. Call light in reach. Bed in lowest position. No other needs identified. Will continue hourly rounding.

## 2017-07-06 NOTE — ED NOTES
"Chief Complaint   Patient presents with   • Chest Pain     PT BIB [facility] for chest pain. Pt had chest pain around 0600 this morning which was relieved by one spray of home nitro. Pt experienced some chest pain again at 1800 this evening which was also relieved by nitro spray at home. Pt wanting to get checked out for earlier chest pain. Pt currently describing 3/10 chest pain \"that's always there, this is normal\". Pt given 325 ASA PTA. Pt is AOx4, denies SOB. EKG completed per protocol, blood work obtained and sent to lab per protocol. /44 mmHg  Pulse 58  Temp(Src) 36.9 °C (98.4 °F)  Resp 18  Ht 1.778 m (5' 10\")  Wt 65.772 kg (145 lb)  BMI 20.81 kg/m2  Chart up for ERP.   "

## 2017-07-06 NOTE — ASSESSMENT & PLAN NOTE
Likely non-cardiac  Cycle troponins, will hold of on stress now for now  +/- cardiology in the morning, likely [facility] as patient wants to go home as soon as possible.

## 2017-07-06 NOTE — DISCHARGE INSTRUCTIONS
Discharge Instructions    Discharged to home by medical transportation with self. Discharged via wheelchair, hospital escort: Yes.  Special equipment needed: Not Applicable    Be sure to schedule a follow-up appointment with your primary care doctor or any specialists as instructed.     Discharge Plan:   Diet Plan: Discussed  Activity Level: Discussed  Smoking Cessation Offered: Patient Refused  Confirmed Follow up Appointment: Patient to Call and Schedule Appointment  Confirmed Symptoms Management: Discussed  Medication Reconciliation Updated: Yes  Influenza Vaccine Indication: Not indicated: Previously immunized this influenza season and > 8 years of age    I understand that a diet low in cholesterol, fat, and sodium is recommended for good health. Unless I have been given specific instructions below for another diet, I accept this instruction as my diet prescription.   Other diet:     Special Instructions: None    · Is patient discharged on Warfarin / Coumadin?   No     · Is patient Post Blood Transfusion?  No    Depression / Suicide Risk    As you are discharged from this [facility] facility, it is important to learn how to keep safe from harming yourself.    Recognize the warning signs:  · Abrupt changes in personality, positive or negative- including increase in energy   · Giving away possessions  · Change in eating patterns- significant weight changes-  positive or negative  · Change in sleeping patterns- unable to sleep or sleeping all the time   · Unwillingness or inability to communicate  · Depression  · Unusual sadness, discouragement and loneliness  · Talk of wanting to die  · Neglect of personal appearance   · Rebelliousness- reckless behavior  · Withdrawal from people/activities they love  · Confusion- inability to concentrate     If you or a loved one observes any of these behaviors or has concerns about self-harm, here's what you can do:  · Talk about it- your feelings and reasons for harming  yourself  · Remove any means that you might use to hurt yourself (examples: pills, rope, extension cords, firearm)  · Get professional help from the community (Mental Health, Substance Abuse, psychological counseling)  · Do not be alone:Call your Safe Contact- someone whom you trust who will be there for you.  · Call your local CRISIS HOTLINE 340-3750 or 999-353-1810  · Call your local Children's Mobile Crisis Response Team [place] (350) 721-0767 or www.CS-Keys  · Call the toll free National Suicide Prevention Hotlines   · National Suicide Prevention Lifeline 037-470-LMVW (2043)  · National Hope Line Network 800-SUICIDE (382-5836)

## 2017-07-06 NOTE — ED NOTES
Pt sleeping comfortably on gurney. Pt with no changes from previous assessments. Respirations are even and unlabored. Bed in lowest position, call light within reach. Pt with no further needs at this time.

## 2017-07-06 NOTE — ASSESSMENT & PLAN NOTE
SBP goal less than 140 mmHg  DBP goal less than 90 mmHg  PRN and antihypertensives to titrate by hospitalist towards goal  Most recent Blood Pressure : 119/44 mmHg

## 2017-07-07 ENCOUNTER — PATIENT OUTREACH (OUTPATIENT)
Dept: HEALTH INFORMATION MANAGEMENT | Facility: OTHER | Age: 77
End: 2017-07-07

## 2020-06-27 ENCOUNTER — APPOINTMENT (OUTPATIENT)
Dept: RADIOLOGY | Facility: MEDICAL CENTER | Age: 80
End: 2020-06-27
Attending: EMERGENCY MEDICINE
Payer: MEDICARE

## 2020-06-27 ENCOUNTER — HOSPITAL ENCOUNTER (EMERGENCY)
Facility: MEDICAL CENTER | Age: 80
End: 2020-06-27
Attending: EMERGENCY MEDICINE
Payer: MEDICARE

## 2020-06-27 VITALS
OXYGEN SATURATION: 97 % | WEIGHT: 160 LBS | TEMPERATURE: 97.4 F | HEIGHT: 71 IN | RESPIRATION RATE: 16 BRPM | DIASTOLIC BLOOD PRESSURE: 59 MMHG | HEART RATE: 72 BPM | SYSTOLIC BLOOD PRESSURE: 116 MMHG | BODY MASS INDEX: 22.4 KG/M2
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
## 2020-06-27 DIAGNOSIS — S02.2XXA CLOSED FRACTURE OF NASAL BONE, INITIAL ENCOUNTER: ICD-10-CM

## 2020-06-27 DIAGNOSIS — S09.90XA CLOSED HEAD INJURY, INITIAL ENCOUNTER: ICD-10-CM

## 2020-06-27 PROCEDURE — 72125 CT NECK SPINE W/O DYE: CPT

## 2020-06-27 PROCEDURE — 99284 EMERGENCY DEPT VISIT MOD MDM: CPT | Mod: 25

## 2020-06-27 PROCEDURE — 70450 CT HEAD/BRAIN W/O DYE: CPT

## 2020-06-27 PROCEDURE — 70486 CT MAXILLOFACIAL W/O DYE: CPT

## 2020-06-27 SDOH — HEALTH STABILITY: MENTAL HEALTH: HOW OFTEN DO YOU HAVE A DRINK CONTAINING ALCOHOL?: 4 OR MORE TIMES A WEEK

## 2020-06-27 SDOH — HEALTH STABILITY: MENTAL HEALTH: HOW MANY STANDARD DRINKS CONTAINING ALCOHOL DO YOU HAVE ON A TYPICAL DAY?: 10 OR MORE

## 2020-06-28 NOTE — ED TRIAGE NOTES
Chief Complaint   Patient presents with   • GLF     Pt BIB [facility] for GLF.  Pt was in wheelchair and rolled into curb and fell out.  Per EMS pt was seen at hospital yesterday for fall and has broken nose.  Pt has complaint of pain to occipital head, no deformity or bruising noted.      Pt states he had 1 pint whiskey today and pt had fsbs 88 PTA.  Pt denies travel, interaction with covid pt or symptoms and is wearing mask.

## 2020-06-28 NOTE — DISCHARGE PLANNING
MSW received call from bedside RN. Pt needs ride home. Pt does not have his wheelchair. Pt stated he has someone at this house that will assist him in his wheelchair and get him out of the cab. MSW provided pt with cab voucher #[id number].

## 2020-06-28 NOTE — ED NOTES
DC home with written and verbal instructions regarding f/u, activity.  Verbalized understanding, WC out.

## 2020-06-28 NOTE — ED PROVIDER NOTES
"ED Provider Note    CHIEF COMPLAINT  Chief Complaint   Patient presents with   • GLF        HPI    Primary care provider: Jonnathan Hilario M.D.   History obtained from: Patient and EMS  History limited by: None     Naif Medina is a 79 y.o. male who presents to the ED by EMS and was seen upon arrival due to TBI protocol.  Per EMS, patient was in his wheelchair and hit a curb and fell out hitting his face and head.  No reported loss of consciousness.  Patient is not on any blood thinners except for daily aspirin.  He denies any other injuries or pain anywhere else except his face.  He denies new weakness or sensory change.  Patient states that his last tetanus shot was approximately 4 years ago.  He reports that he drinks \"too much\" alcohol daily and also smokes cigarettes.  He reports chronic cough due to smoking which is unchanged.  He denies fever/shortness of breath or difficulty breathing/nausea/vomiting/diarrhea/dysuria.  He denies recent travels or known ill contacts.    REVIEW OF SYSTEMS  Please see HPI for pertinent positives/negatives.  All other systems reviewed and are negative.     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 at ENDOSCOPY [facility] ORS   • COLONOSCOPY - ENDO  3/22/2013    Performed by Edson Mccoy M.D. at ENDOSCOPY [facility] ORS   • ANGIOPLASTY     • PB APPENDECTOMY     • ZZZ CARDIAC CATH          SOCIAL HISTORY  Social History     Tobacco Use   • Smoking status: Current Every Day Smoker     Packs/day: " "1.00     Types: Cigarettes   • Smokeless tobacco: Never Used   Substance and Sexual Activity   • Alcohol use: Yes     Frequency: 4 or more times a week     Drinks per session: 10 or more     Comment: \"beer occasionally during sports\"   • Drug use: No   • Sexual activity: Not on file        FAMILY HISTORY  Family History   Problem Relation Age of Onset   • Heart Attack Father         CURRENT MEDICATIONS  Home Medications     Reviewed by Franklyn Espinosa R.N. (Registered Nurse) on 06/27/20 at 1723  Med List Status: Partial   Medication Last Dose Status   albuterol (VENTOLIN OR PROVENTIL) 108 (90 BASE) MCG/ACT Aero Soln inhalation aerosol  Active   aspirin EC (ECOTRIN) 81 MG Tablet Delayed Response  Active   busPIRone (BUSPAR) 5 MG Tab  Active   carvedilol (COREG) 6.25 MG Tab  Active   diphenhydrAMINE (BENADRYL) 25 MG capsule  Active   Ferrous Sulfate (IRON) 325 (65 FE) MG Tab  Active   ibuprofen (MOTRIN) 400 MG Tab  Active   isosorbide mononitrate SR (IMDUR) 30 MG TABLET SR 24 HR  Active   lisinopril (PRINIVIL) 20 MG Tab  Active   Magnesium Oxide 420 MG Tab  Active   omeprazole (PRILOSEC) 40 MG delayed-release capsule  Active   simvastatin (ZOCOR) 20 MG Tab  Active   therapeutic multivitamin-minerals (THERAGRAN-M) Tab  Active                 ALLERGIES  Allergies   Allergen Reactions   • Heparin Unspecified     [facility]ical=[facility]; pt confirmed but can't recall the rxn.        PHYSICAL EXAM  VITAL SIGNS: /59   Pulse 72   Temp 36.3 °C (97.4 °F) (Temporal)   Resp 16   Ht 1.803 m (5' 11\")   Wt 72.6 kg (160 lb)   SpO2 97%   BMI 22.32 kg/m²  @BLANCA[[id number]::@     Pulse ox interpretation: 97% I interpret this pulse ox as normal     Constitutional: Thin patient, alert in no apparent distress, nontoxic appearance   HENT: Bilateral periorbital ecchymosis with trace swelling and tenderness to palpation of nasal bridge, normocephalic, bilateral external ears normal, no hemotympanum bilaterally, oropharynx moist and " clear, airway patent, nose with no hematoma/bleeding/drainage, midface stable, no malocclusion, no mastoid swelling/bruising   Eyes: PERRL, EOMI without apparent restriction or entrapment, conjunctiva without erythema, no discharge, no icterus   Neck: Soft and supple, trachea midline, no stridor, no swelling/bruising, no midline C-spine tenderness, no stepoffs, no LAD, no JVD, good ROM without discomfort  Cardiovascular: Regular rate and rhythm, no murmurs/rubs/gallops, strong distal pulses and good perfusion   Thorax & Lungs: No respiratory distress, CTAB with equal BS bilaterally, no chest tenderness/deformity/swelling/crepitus/bruising   Abdomen: Soft, nontender, nondistended, no G/R, no bruising, normal BS, pelvis stable   Back: Normal inspection, no swelling/bruising, no midline TTP, no stepoffs, no CVAT   Extremities: No cyanosis, no edema, no gross deformity, good ROM at all joints, no tenderness, intact distal pulses with brisk cap refill   Skin: Warm, dry, no pallor/cyanosis, no rash noted except scattered bruises  Lymphatic: No lymphadenopathy noted   Neuro: A/O times 3, GCS15, no focal deficits noted, sensation intact to touch, equal strength bilateral UE/LE   Psychiatric: Cooperative, intoxicated with jovial mood, no hallucinations or delusions      DIAGNOSTIC STUDIES / PROCEDURES        LABS  All labs reviewed by me.     Results for orders placed or performed during the hospital encounter of 07/05/17   Troponin   Result Value Ref Range    Troponin I <0.01 0.00 - 0.04 ng/mL   Btype Natriuretic Peptide   Result Value Ref Range    B Natriuretic Peptide 58 0 - 100 pg/mL   CBC with Differential   Result Value Ref Range    WBC 6.4 4.8 - 10.8 K/uL    RBC 3.47 (L) 4.70 - 6.10 M/uL    Hemoglobin 11.4 (L) 14.0 - 18.0 g/dL    Hematocrit 33.3 (L) 42.0 - 52.0 %    MCV 96.0 81.4 - 97.8 fL    MCH 32.9 27.0 - 33.0 pg    MCHC 34.2 33.7 - 35.3 g/dL    RDW 47.9 35.9 - 50.0 fL    Platelet Count 135 (L) 164 - 446 K/uL     MPV 10.7 9.0 - 12.9 fL    Neutrophils-Polys 39.70 (L) 44.00 - 72.00 %    Lymphocytes 40.10 22.00 - 41.00 %    Monocytes 11.20 0.00 - 13.40 %    Eosinophils 8.20 (H) 0.00 - 6.90 %    Basophils 0.30 0.00 - 1.80 %    Immature Granulocytes 0.50 0.00 - 0.90 %    Nucleated RBC 0.00 /100 WBC    Neutrophils (Absolute) 2.53 1.82 - 7.42 K/uL    Lymphs (Absolute) 2.55 1.00 - 4.80 K/uL    Monos (Absolute) 0.71 0.00 - 0.85 K/uL    Eos (Absolute) 0.52 (H) 0.00 - 0.51 K/uL    Baso (Absolute) 0.02 0.00 - 0.12 K/uL    Immature Granulocytes (abs) 0.03 0.00 - 0.11 K/uL    NRBC (Absolute) 0.00 K/uL   Complete Metabolic Panel (CMP)   Result Value Ref Range    Sodium 136 135 - 145 mmol/L    Potassium 4.1 3.6 - 5.5 mmol/L    Chloride 110 96 - 112 mmol/L    Co2 20 20 - 33 mmol/L    Anion Gap 6.0 0.0 - 11.9    Glucose 87 65 - 99 mg/dL    Bun 23 (H) 8 - 22 mg/dL    Creatinine 1.34 0.50 - 1.40 mg/dL    Calcium 8.4 (L) 8.5 - 10.5 mg/dL    AST(SGOT) 14 12 - 45 U/L    ALT(SGPT) 5 2 - 50 U/L    Alkaline Phosphatase 86 30 - 99 U/L    Total Bilirubin 0.3 0.1 - 1.5 mg/dL    Albumin 3.4 3.2 - 4.9 g/dL    Total Protein 6.2 6.0 - 8.2 g/dL    Globulin 2.8 1.9 - 3.5 g/dL    A-G Ratio 1.2 g/dL   Prothrombin Time   Result Value Ref Range    PT 16.6 (H) 12.0 - 14.6 sec    INR 1.30 (H) 0.87 - 1.13   APTT   Result Value Ref Range    APTT 29.3 24.7 - 36.0 sec   Lipase   Result Value Ref Range    Lipase 27 11 - 82 U/L   ESTIMATED GFR   Result Value Ref Range    GFR If African American >60 >60 mL/min/1.73 m 2    GFR If Non African American 52 (A) >60 mL/min/1.73 m 2   Basic Metabolic Panel (BMP)   Result Value Ref Range    Sodium 136 135 - 145 mmol/L    Potassium 4.7 3.6 - 5.5 mmol/L    Chloride 111 96 - 112 mmol/L    Co2 19 (L) 20 - 33 mmol/L    Glucose 86 65 - 99 mg/dL    Bun 23 (H) 8 - 22 mg/dL    Creatinine 1.29 0.50 - 1.40 mg/dL    Calcium 8.4 (L) 8.5 - 10.5 mg/dL    Anion Gap 6.0 0.0 - 11.9   TROPONIN   Result Value Ref Range    Troponin I <0.01 0.00 -  0.04 ng/mL   TROPONIN   Result Value Ref Range    Troponin I <0.01 0.00 - 0.04 ng/mL   ESTIMATED GFR   Result Value Ref Range    GFR If African American >60 >60 mL/min/1.73 m 2    GFR If Non African American 54 (A) >60 mL/min/1.73 m 2   EKG (ER)   Result Value Ref Range    Report       [facility]    Test Date:  2017  Pt Name:    GALEN CAMPBELL               Department: ER  MRN:        [id number]                      Room:       RD 01  Gender:     M                            Technician: [id number]  :        [id number]                   Requested By:ER TRIAGE PROTOCOL  Order #:    [id number]                    Reading MD:    Measurements  Intervals                                Axis  Rate:       56                           P:          51  NM:         [id number]                          QRS:        -43  QRSD:       152                          T:          45  QT:         496  QTc:        479    Interpretive Statements  SINUS BRADYCARDIA  FIRST DEGREE AV BLOCK  IVCD, CONSIDER ATYPICAL RBBB  Compared to ECG 2015 08:58:00  First degree AV block now present  Sinus rhythm no longer present  ST (T wave) deviation no longer present  Possible ischemia no longer present     EKG (IP)   Result Value Ref Range    Report       [facility]    Test Date:  2017  Pt Name:    GALEN CAMPBELL               Department: CPU  MRN:        [id number]                      Room:       T207  Gender:     M                            Technician: [id number]  :        1940                   Requested By:REMI CAMPOVERDE  Order #:    [id number]                    Reading MD: Oliverio Fuentes MD    Measurements  Intervals                                Axis  Rate:       57                           P:          50  NM:         [id number]                          QRS:        56  QRSD:       148                          T:          9  QT:         516  QTc:        503    Interpretive Statements  SINUS  BRADYCARDIA  VENTRICULAR PREMATURE COMPLEX  FIRST DEGREE AV BLOCK  RIGHT VENTRICULAR CONDUCTION DELAY    Electronically Signed On 2017 16:00:02 PDT by Oliverio Fuentes MD     EKG (IP)   Result Value Ref Range    Report       [facility]    Test Date:  2017  Pt Name:    GALEN CAMPBELL               Department: CPU  MRN:        [id number]                      Room:       T207  Gender:     M                            Technician: ANDREIA  :        1940                   Requested By:REMI CAMPOVERDE  Order #:    [id number]                    Reading MD: Oliverio Fuentes MD    Measurements  Intervals                                Axis  Rate:       51                           P:          78  ID:         [id number]                          QRS:        78  QRSD:       138                          T:          19  QT:         492  QTc:        454    Interpretive Statements  SINUS BRADYCARDIA  FIRST DEGREE AV BLOCK  RIGHT BUNDLE BRANCH BLOCK    Electronically Signed On 2017 16:45:25 PDT by Oliverio Fuentes MD          RADIOLOGY  The radiologist's interpretation of all radiological studies have been reviewed by me.     CT-MAXILLOFACIAL W/O PLUS RECONS   Final Result      1.  Acute fractures of both nasal bones. No other maxillofacial fractures.   2.  Moderate paranasal sinus disease, involving predominantly ethmoid and frontal sinuses.      CT-CSPINE WITHOUT PLUS RECONS   Final Result      1.  No acute fracture or listhesis in the cervical spine.   2.  Pulmonary emphysema.      CT-HEAD W/O   Final Result      1. No CT evidence of acute infarct, hemorrhage or mass.   2. Moderate global parenchymal atrophy. Chronic small vessel ischemic changes.   3. Moderate paranasal sinus disease.             COURSE & MEDICAL DECISION MAKING  Nursing notes, VS, PMSFHx reviewed in chart.     Review of past medical records shows the patient was last admitted to this hospital 2017 for chest pain and discharged on  July 6, 2017.      Differential diagnoses considered include but are not limited to: Contusion, concussion/post-concussion syndrome, Fx, intracranial hemorrhage, abrasion/laceration      History and physical exam as above.  Imaging studies with findings as above.  I discussed the findings with the patient.  This is an intoxicated but alert and jovial patient in no acute distress and nontoxic in appearance without focal neurological findings or evidence of other significant traumatic injuries.  He was advised on outpatient follow-up regarding his nasal fracture as well as supportive home care.  Return to ED precautions were given.  He was also advised to limit his alcohol use and to seek help if necessary.  Patient verbalized understanding and agreed with plan of care with no further questions or concerns.      The patient is referred to a primary physician for blood pressure management, diabetic screening, and for all other preventative health concerns.       FINAL IMPRESSION  1. Closed fracture of nasal bone, initial encounter Acute   2. Closed head injury, initial encounter Acute   3. Accidental fall, initial encounter Acute   4. Alcoholism /alcohol abuse (HCC) Chronic          DISPOSITION  Patient will be discharged home in stable condition.       FOLLOW UP  Jonnathan Hilario M.D.  [address]  [place] [id number] [phone]    Call in 2 days      [facility], Emergency Dept  [address] [id number] [phone]    If symptoms worsen    Lisa Galaviz M.D.  [address]  [place] [id number] [phone]    Call in 2 days           OUTPATIENT MEDICATIONS  Discharge Medication List as of 6/27/2020  5:57 PM             Electronically signed by: Robinson Garg D.O., 6/27/2020 5:48 PM      Portions of this record were made with voice recognition software.  Despite my review, spelling/grammar/context errors may still remain.  Interpretation of this chart should  be taken in this context.

## 2021-01-14 DIAGNOSIS — Z23 NEED FOR VACCINATION: ICD-10-CM

## 2022-01-01 ENCOUNTER — APPOINTMENT (OUTPATIENT)
Dept: CARDIOLOGY | Facility: MEDICAL CENTER | Age: 82
DRG: 871 | End: 2022-01-01
Payer: COMMERCIAL

## 2022-01-01 ENCOUNTER — HOSPITAL ENCOUNTER (INPATIENT)
Facility: MEDICAL CENTER | Age: 82
LOS: 9 days | DRG: 871 | End: 2022-02-06
Attending: EMERGENCY MEDICINE | Admitting: INTERNAL MEDICINE
Payer: COMMERCIAL

## 2022-01-01 ENCOUNTER — APPOINTMENT (OUTPATIENT)
Dept: RADIOLOGY | Facility: MEDICAL CENTER | Age: 82
DRG: 871 | End: 2022-01-01
Attending: STUDENT IN AN ORGANIZED HEALTH CARE EDUCATION/TRAINING PROGRAM
Payer: COMMERCIAL

## 2022-01-01 ENCOUNTER — APPOINTMENT (OUTPATIENT)
Dept: RADIOLOGY | Facility: MEDICAL CENTER | Age: 82
DRG: 871 | End: 2022-01-01
Payer: COMMERCIAL

## 2022-01-01 ENCOUNTER — APPOINTMENT (OUTPATIENT)
Dept: RADIOLOGY | Facility: MEDICAL CENTER | Age: 82
DRG: 871 | End: 2022-01-01
Attending: EMERGENCY MEDICINE
Payer: COMMERCIAL

## 2022-01-01 ENCOUNTER — HOSPICE ADMISSION (OUTPATIENT)
Dept: HOSPICE | Facility: HOSPICE | Age: 82
End: 2022-01-01
Payer: MEDICARE

## 2022-01-01 VITALS
RESPIRATION RATE: 18 BRPM | WEIGHT: 123.46 LBS | SYSTOLIC BLOOD PRESSURE: 109 MMHG | DIASTOLIC BLOOD PRESSURE: 54 MMHG | HEART RATE: 95 BPM | OXYGEN SATURATION: 92 % | BODY MASS INDEX: 17.67 KG/M2 | HEIGHT: 70 IN | TEMPERATURE: 97.8 F

## 2022-01-01 DIAGNOSIS — A41.3: ICD-10-CM

## 2022-01-01 DIAGNOSIS — J96.01: ICD-10-CM

## 2022-01-01 DIAGNOSIS — R09.02 HYPOXEMIA: ICD-10-CM

## 2022-01-01 DIAGNOSIS — Z66 DNR (DO NOT RESUSCITATE): ICD-10-CM

## 2022-01-01 DIAGNOSIS — J18.9 PNEUMONIA OF RIGHT MIDDLE LOBE DUE TO INFECTIOUS ORGANISM: ICD-10-CM

## 2022-01-01 DIAGNOSIS — R65.20: ICD-10-CM

## 2022-01-01 DIAGNOSIS — I25.5 ISCHEMIC CARDIOMYOPATHY: ICD-10-CM

## 2022-01-01 DIAGNOSIS — J96.21 ACUTE ON CHRONIC RESPIRATORY FAILURE WITH HYPOXIA (HCC): ICD-10-CM

## 2022-01-01 LAB
ALBUMIN SERPL BCP-MCNC: 2.6 G/DL (ref 3.2–4.9)
ALBUMIN SERPL BCP-MCNC: 2.9 G/DL (ref 3.2–4.9)
ALBUMIN SERPL BCP-MCNC: 3.6 G/DL (ref 3.2–4.9)
ALBUMIN/GLOB SERPL: 0.7 G/DL
ALBUMIN/GLOB SERPL: 0.7 G/DL
ALBUMIN/GLOB SERPL: 0.8 G/DL
ALP SERPL-CCNC: 104 U/L (ref 30–99)
ALP SERPL-CCNC: 88 U/L (ref 30–99)
ALP SERPL-CCNC: 99 U/L (ref 30–99)
ALT SERPL-CCNC: 20 U/L (ref 2–50)
ALT SERPL-CCNC: 27 U/L (ref 2–50)
ALT SERPL-CCNC: 30 U/L (ref 2–50)
ANION GAP SERPL CALC-SCNC: 10 MMOL/L (ref 7–16)
ANION GAP SERPL CALC-SCNC: 13 MMOL/L (ref 7–16)
ANION GAP SERPL CALC-SCNC: 15 MMOL/L (ref 7–16)
ANION GAP SERPL CALC-SCNC: 16 MMOL/L (ref 7–16)
ANION GAP SERPL CALC-SCNC: 16 MMOL/L (ref 7–16)
ANION GAP SERPL CALC-SCNC: 18 MMOL/L (ref 7–16)
ANION GAP SERPL CALC-SCNC: 19 MMOL/L (ref 7–16)
ANISOCYTOSIS BLD QL SMEAR: ABNORMAL
AST SERPL-CCNC: 29 U/L (ref 12–45)
AST SERPL-CCNC: 43 U/L (ref 12–45)
AST SERPL-CCNC: 43 U/L (ref 12–45)
BACTERIA BLD CULT: ABNORMAL
BACTERIA BLD CULT: ABNORMAL
BACTERIA BLD CULT: NORMAL
BACTERIA BLD CULT: NORMAL
BACTERIA SPEC RESP CULT: NORMAL
BASE EXCESS BLDA CALC-SCNC: -5 MMOL/L (ref -4–3)
BASOPHILS # BLD AUTO: 0 % (ref 0–1.8)
BASOPHILS # BLD AUTO: 0.2 % (ref 0–1.8)
BASOPHILS # BLD AUTO: 0.4 % (ref 0–1.8)
BASOPHILS # BLD: 0 K/UL (ref 0–0.12)
BASOPHILS # BLD: 0.04 K/UL (ref 0–0.12)
BASOPHILS # BLD: 0.05 K/UL (ref 0–0.12)
BILIRUB SERPL-MCNC: 0.5 MG/DL (ref 0.1–1.5)
BILIRUB SERPL-MCNC: 0.6 MG/DL (ref 0.1–1.5)
BILIRUB SERPL-MCNC: 1.5 MG/DL (ref 0.1–1.5)
BODY TEMPERATURE: ABNORMAL CENTIGRADE
BUN SERPL-MCNC: 25 MG/DL (ref 8–22)
BUN SERPL-MCNC: 28 MG/DL (ref 8–22)
BUN SERPL-MCNC: 29 MG/DL (ref 8–22)
BUN SERPL-MCNC: 31 MG/DL (ref 8–22)
BUN SERPL-MCNC: 36 MG/DL (ref 8–22)
BUN SERPL-MCNC: 39 MG/DL (ref 8–22)
BUN SERPL-MCNC: 43 MG/DL (ref 8–22)
CALCIUM SERPL-MCNC: 8.6 MG/DL (ref 8.5–10.5)
CALCIUM SERPL-MCNC: 8.9 MG/DL (ref 8.5–10.5)
CALCIUM SERPL-MCNC: 9.1 MG/DL (ref 8.5–10.5)
CALCIUM SERPL-MCNC: 9.2 MG/DL (ref 8.5–10.5)
CALCIUM SERPL-MCNC: 9.3 MG/DL (ref 8.5–10.5)
CALCIUM SERPL-MCNC: 9.3 MG/DL (ref 8.5–10.5)
CALCIUM SERPL-MCNC: 9.4 MG/DL (ref 8.5–10.5)
CHLORIDE SERPL-SCNC: 104 MMOL/L (ref 96–112)
CHLORIDE SERPL-SCNC: 106 MMOL/L (ref 96–112)
CHLORIDE SERPL-SCNC: 109 MMOL/L (ref 96–112)
CHLORIDE SERPL-SCNC: 111 MMOL/L (ref 96–112)
CHLORIDE SERPL-SCNC: 112 MMOL/L (ref 96–112)
CHLORIDE SERPL-SCNC: 114 MMOL/L (ref 96–112)
CHLORIDE SERPL-SCNC: 120 MMOL/L (ref 96–112)
CHOLEST SERPL-MCNC: 83 MG/DL (ref 100–199)
CO2 SERPL-SCNC: 16 MMOL/L (ref 20–33)
CO2 SERPL-SCNC: 17 MMOL/L (ref 20–33)
CO2 SERPL-SCNC: 17 MMOL/L (ref 20–33)
CO2 SERPL-SCNC: 19 MMOL/L (ref 20–33)
CO2 SERPL-SCNC: 20 MMOL/L (ref 20–33)
CO2 SERPL-SCNC: 20 MMOL/L (ref 20–33)
CO2 SERPL-SCNC: 23 MMOL/L (ref 20–33)
CREAT SERPL-MCNC: 0.75 MG/DL (ref 0.5–1.4)
CREAT SERPL-MCNC: 0.81 MG/DL (ref 0.5–1.4)
CREAT SERPL-MCNC: 0.85 MG/DL (ref 0.5–1.4)
CREAT SERPL-MCNC: 1.17 MG/DL (ref 0.5–1.4)
CREAT SERPL-MCNC: 1.22 MG/DL (ref 0.5–1.4)
CREAT SERPL-MCNC: 1.22 MG/DL (ref 0.5–1.4)
CREAT SERPL-MCNC: 1.36 MG/DL (ref 0.5–1.4)
EKG IMPRESSION: NORMAL
EOSINOPHIL # BLD AUTO: 0 K/UL (ref 0–0.51)
EOSINOPHIL # BLD AUTO: 0.04 K/UL (ref 0–0.51)
EOSINOPHIL # BLD AUTO: 0.04 K/UL (ref 0–0.51)
EOSINOPHIL NFR BLD: 0 % (ref 0–6.9)
EOSINOPHIL NFR BLD: 0.2 % (ref 0–6.9)
EOSINOPHIL NFR BLD: 0.3 % (ref 0–6.9)
ERYTHROCYTE [DISTWIDTH] IN BLOOD BY AUTOMATED COUNT: 50.1 FL (ref 35.9–50)
ERYTHROCYTE [DISTWIDTH] IN BLOOD BY AUTOMATED COUNT: 50.8 FL (ref 35.9–50)
ERYTHROCYTE [DISTWIDTH] IN BLOOD BY AUTOMATED COUNT: 52.6 FL (ref 35.9–50)
ERYTHROCYTE [DISTWIDTH] IN BLOOD BY AUTOMATED COUNT: 52.9 FL (ref 35.9–50)
ERYTHROCYTE [DISTWIDTH] IN BLOOD BY AUTOMATED COUNT: 53 FL (ref 35.9–50)
FLUAV RNA SPEC QL NAA+PROBE: NEGATIVE
FLUBV RNA SPEC QL NAA+PROBE: NEGATIVE
GLOBULIN SER CALC-MCNC: 3.7 G/DL (ref 1.9–3.5)
GLOBULIN SER CALC-MCNC: 4.4 G/DL (ref 1.9–3.5)
GLOBULIN SER CALC-MCNC: 4.7 G/DL (ref 1.9–3.5)
GLUCOSE SERPL-MCNC: 103 MG/DL (ref 65–99)
GLUCOSE SERPL-MCNC: 112 MG/DL (ref 65–99)
GLUCOSE SERPL-MCNC: 115 MG/DL (ref 65–99)
GLUCOSE SERPL-MCNC: 79 MG/DL (ref 65–99)
GLUCOSE SERPL-MCNC: 80 MG/DL (ref 65–99)
GLUCOSE SERPL-MCNC: 80 MG/DL (ref 65–99)
GLUCOSE SERPL-MCNC: 94 MG/DL (ref 65–99)
GRAM STN SPEC: NORMAL
GRAM STN SPEC: NORMAL
HCO3 BLDA-SCNC: 16 MMOL/L (ref 17–25)
HCT VFR BLD AUTO: 33.5 % (ref 42–52)
HCT VFR BLD AUTO: 36.8 % (ref 42–52)
HCT VFR BLD AUTO: 37 % (ref 42–52)
HCT VFR BLD AUTO: 38.5 % (ref 42–52)
HCT VFR BLD AUTO: 40.2 % (ref 42–52)
HDLC SERPL-MCNC: 28 MG/DL
HGB BLD-MCNC: 11.2 G/DL (ref 14–18)
HGB BLD-MCNC: 11.9 G/DL (ref 14–18)
HGB BLD-MCNC: 11.9 G/DL (ref 14–18)
HGB BLD-MCNC: 12.7 G/DL (ref 14–18)
HGB BLD-MCNC: 13.5 G/DL (ref 14–18)
IMM GRANULOCYTES # BLD AUTO: 0.15 K/UL (ref 0–0.11)
IMM GRANULOCYTES # BLD AUTO: 0.26 K/UL (ref 0–0.11)
IMM GRANULOCYTES NFR BLD AUTO: 0.8 % (ref 0–0.9)
IMM GRANULOCYTES NFR BLD AUTO: 2 % (ref 0–0.9)
LACTATE BLD-SCNC: 2.1 MMOL/L (ref 0.5–2)
LACTATE BLD-SCNC: 2.1 MMOL/L (ref 0.5–2)
LACTATE BLD-SCNC: 3 MMOL/L (ref 0.5–2)
LDLC SERPL CALC-MCNC: 40 MG/DL
LV EJECT FRACT  99904: 25
LV EJECT FRACT MOD 2C 99903: 32.19
LV EJECT FRACT MOD 4C 99902: 28.66
LV EJECT FRACT MOD BP 99901: 27.51
LYMPHOCYTES # BLD AUTO: 0.89 K/UL (ref 1–4.8)
LYMPHOCYTES # BLD AUTO: 1.28 K/UL (ref 1–4.8)
LYMPHOCYTES # BLD AUTO: 1.48 K/UL (ref 1–4.8)
LYMPHOCYTES NFR BLD: 11.3 % (ref 22–41)
LYMPHOCYTES NFR BLD: 6.1 % (ref 22–41)
LYMPHOCYTES NFR BLD: 7.3 % (ref 22–41)
MACROCYTES BLD QL SMEAR: ABNORMAL
MAGNESIUM SERPL-MCNC: 1.8 MG/DL (ref 1.5–2.5)
MAGNESIUM SERPL-MCNC: 2.2 MG/DL (ref 1.5–2.5)
MAGNESIUM SERPL-MCNC: 2.3 MG/DL (ref 1.5–2.5)
MAGNESIUM SERPL-MCNC: 2.5 MG/DL (ref 1.5–2.5)
MANUAL DIFF BLD: NORMAL
MCH RBC QN AUTO: 32 PG (ref 27–33)
MCH RBC QN AUTO: 32.1 PG (ref 27–33)
MCH RBC QN AUTO: 32.2 PG (ref 27–33)
MCH RBC QN AUTO: 32.3 PG (ref 27–33)
MCH RBC QN AUTO: 32.6 PG (ref 27–33)
MCHC RBC AUTO-ENTMCNC: 32.2 G/DL (ref 33.7–35.3)
MCHC RBC AUTO-ENTMCNC: 32.3 G/DL (ref 33.7–35.3)
MCHC RBC AUTO-ENTMCNC: 33 G/DL (ref 33.7–35.3)
MCHC RBC AUTO-ENTMCNC: 33.4 G/DL (ref 33.7–35.3)
MCHC RBC AUTO-ENTMCNC: 33.6 G/DL (ref 33.7–35.3)
MCV RBC AUTO: 96 FL (ref 81.4–97.8)
MCV RBC AUTO: 96.2 FL (ref 81.4–97.8)
MCV RBC AUTO: 98.7 FL (ref 81.4–97.8)
MCV RBC AUTO: 99.5 FL (ref 81.4–97.8)
MCV RBC AUTO: 99.5 FL (ref 81.4–97.8)
MONOCYTES # BLD AUTO: 0.88 K/UL (ref 0–0.85)
MONOCYTES # BLD AUTO: 1.02 K/UL (ref 0–0.85)
MONOCYTES # BLD AUTO: 1.14 K/UL (ref 0–0.85)
MONOCYTES NFR BLD AUTO: 6.5 % (ref 0–13.4)
MONOCYTES NFR BLD AUTO: 6.7 % (ref 0–13.4)
MONOCYTES NFR BLD AUTO: 7 % (ref 0–13.4)
MORPHOLOGY BLD-IMP: NORMAL
NEUTROPHILS # BLD AUTO: 10.37 K/UL (ref 1.82–7.42)
NEUTROPHILS # BLD AUTO: 12.69 K/UL (ref 1.82–7.42)
NEUTROPHILS # BLD AUTO: 15 K/UL (ref 1.82–7.42)
NEUTROPHILS NFR BLD: 79.3 % (ref 44–72)
NEUTROPHILS NFR BLD: 85 % (ref 44–72)
NEUTROPHILS NFR BLD: 86.9 % (ref 44–72)
NRBC # BLD AUTO: 0 K/UL
NRBC BLD-RTO: 0 /100 WBC
NT-PROBNP SERPL IA-MCNC: ABNORMAL PG/ML (ref 0–125)
PCO2 BLDA: 22.1 MMHG (ref 26–37)
PH BLDA: 7.48 [PH] (ref 7.4–7.5)
PLATELET # BLD AUTO: 164 K/UL (ref 164–446)
PLATELET # BLD AUTO: 186 K/UL (ref 164–446)
PLATELET # BLD AUTO: 189 K/UL (ref 164–446)
PLATELET # BLD AUTO: 196 K/UL (ref 164–446)
PLATELET # BLD AUTO: 302 K/UL (ref 164–446)
PLATELET BLD QL SMEAR: NORMAL
PMV BLD AUTO: 10.8 FL (ref 9–12.9)
PMV BLD AUTO: 10.9 FL (ref 9–12.9)
PMV BLD AUTO: 11 FL (ref 9–12.9)
PMV BLD AUTO: 11.3 FL (ref 9–12.9)
PMV BLD AUTO: 11.5 FL (ref 9–12.9)
PO2 BLDA: 60.6 MMHG (ref 64–87)
POTASSIUM SERPL-SCNC: 3.3 MMOL/L (ref 3.6–5.5)
POTASSIUM SERPL-SCNC: 3.6 MMOL/L (ref 3.6–5.5)
POTASSIUM SERPL-SCNC: 3.8 MMOL/L (ref 3.6–5.5)
POTASSIUM SERPL-SCNC: 3.8 MMOL/L (ref 3.6–5.5)
POTASSIUM SERPL-SCNC: 3.9 MMOL/L (ref 3.6–5.5)
PROCALCITONIN SERPL-MCNC: 0.89 NG/ML
PROCALCITONIN SERPL-MCNC: 4.45 NG/ML
PROCALCITONIN SERPL-MCNC: 5.47 NG/ML
PROCALCITONIN SERPL-MCNC: 8.73 NG/ML
PROT SERPL-MCNC: 6.3 G/DL (ref 6–8.2)
PROT SERPL-MCNC: 7.3 G/DL (ref 6–8.2)
PROT SERPL-MCNC: 8.3 G/DL (ref 6–8.2)
RBC # BLD AUTO: 3.49 M/UL (ref 4.7–6.1)
RBC # BLD AUTO: 3.7 M/UL (ref 4.7–6.1)
RBC # BLD AUTO: 3.72 M/UL (ref 4.7–6.1)
RBC # BLD AUTO: 3.9 M/UL (ref 4.7–6.1)
RBC # BLD AUTO: 4.18 M/UL (ref 4.7–6.1)
RBC BLD AUTO: PRESENT
RSV RNA SPEC QL NAA+PROBE: NEGATIVE
SAO2 % BLDA: 90.9 % (ref 93–99)
SARS-COV-2 RNA RESP QL NAA+PROBE: NOTDETECTED
SARS-COV-2 RNA RESP QL NAA+PROBE: NOTDETECTED
SIGNIFICANT IND 70042: ABNORMAL
SIGNIFICANT IND 70042: NORMAL
SITE SITE: ABNORMAL
SITE SITE: NORMAL
SODIUM SERPL-SCNC: 139 MMOL/L (ref 135–145)
SODIUM SERPL-SCNC: 141 MMOL/L (ref 135–145)
SODIUM SERPL-SCNC: 141 MMOL/L (ref 135–145)
SODIUM SERPL-SCNC: 146 MMOL/L (ref 135–145)
SODIUM SERPL-SCNC: 147 MMOL/L (ref 135–145)
SODIUM SERPL-SCNC: 148 MMOL/L (ref 135–145)
SODIUM SERPL-SCNC: 153 MMOL/L (ref 135–145)
SOURCE SOURCE: ABNORMAL
SOURCE SOURCE: NORMAL
SPECIMEN SOURCE: NORMAL
SPECIMEN SOURCE: NORMAL
TRIGL SERPL-MCNC: 74 MG/DL (ref 0–149)
TROPONIN T SERPL-MCNC: 101 NG/L (ref 6–19)
TROPONIN T SERPL-MCNC: 106 NG/L (ref 6–19)
TROPONIN T SERPL-MCNC: 109 NG/L (ref 6–19)
TROPONIN T SERPL-MCNC: 109 NG/L (ref 6–19)
TROPONIN T SERPL-MCNC: 116 NG/L (ref 6–19)
WBC # BLD AUTO: 13.1 K/UL (ref 4.8–10.8)
WBC # BLD AUTO: 14.6 K/UL (ref 4.8–10.8)
WBC # BLD AUTO: 16.8 K/UL (ref 4.8–10.8)
WBC # BLD AUTO: 17.7 K/UL (ref 4.8–10.8)
WBC # BLD AUTO: 19.8 K/UL (ref 4.8–10.8)

## 2022-01-01 PROCEDURE — A9270 NON-COVERED ITEM OR SERVICE: HCPCS | Performed by: INTERNAL MEDICINE

## 2022-01-01 PROCEDURE — 700101 HCHG RX REV CODE 250: Performed by: HOSPITALIST

## 2022-01-01 PROCEDURE — 92526 ORAL FUNCTION THERAPY: CPT

## 2022-01-01 PROCEDURE — 36415 COLL VENOUS BLD VENIPUNCTURE: CPT

## 2022-01-01 PROCEDURE — 96365 THER/PROPH/DIAG IV INF INIT: CPT

## 2022-01-01 PROCEDURE — A9270 NON-COVERED ITEM OR SERVICE: HCPCS

## 2022-01-01 PROCEDURE — 80048 BASIC METABOLIC PNL TOTAL CA: CPT

## 2022-01-01 PROCEDURE — 700102 HCHG RX REV CODE 250 W/ 637 OVERRIDE(OP): Performed by: HOSPITALIST

## 2022-01-01 PROCEDURE — 700111 HCHG RX REV CODE 636 W/ 250 OVERRIDE (IP): Performed by: HOSPITALIST

## 2022-01-01 PROCEDURE — 700102 HCHG RX REV CODE 250 W/ 637 OVERRIDE(OP)

## 2022-01-01 PROCEDURE — 770001 HCHG ROOM/CARE - MED/SURG/GYN PRIV*

## 2022-01-01 PROCEDURE — 99232 SBSQ HOSP IP/OBS MODERATE 35: CPT | Mod: FS | Performed by: NURSE PRACTITIONER

## 2022-01-01 PROCEDURE — U0005 INFEC AGEN DETEC AMPLI PROBE: HCPCS

## 2022-01-01 PROCEDURE — A9270 NON-COVERED ITEM OR SERVICE: HCPCS | Performed by: STUDENT IN AN ORGANIZED HEALTH CARE EDUCATION/TRAINING PROGRAM

## 2022-01-01 PROCEDURE — A9270 NON-COVERED ITEM OR SERVICE: HCPCS | Performed by: HOSPITALIST

## 2022-01-01 PROCEDURE — 99231 SBSQ HOSP IP/OBS SF/LOW 25: CPT | Mod: GC | Performed by: HOSPITALIST

## 2022-01-01 PROCEDURE — 99232 SBSQ HOSP IP/OBS MODERATE 35: CPT | Mod: GC | Performed by: HOSPITALIST

## 2022-01-01 PROCEDURE — 80053 COMPREHEN METABOLIC PANEL: CPT

## 2022-01-01 PROCEDURE — 97166 OT EVAL MOD COMPLEX 45 MIN: CPT

## 2022-01-01 PROCEDURE — 700111 HCHG RX REV CODE 636 W/ 250 OVERRIDE (IP): Performed by: INTERNAL MEDICINE

## 2022-01-01 PROCEDURE — 99233 SBSQ HOSP IP/OBS HIGH 50: CPT | Mod: GC | Performed by: HOSPITALIST

## 2022-01-01 PROCEDURE — 83735 ASSAY OF MAGNESIUM: CPT

## 2022-01-01 PROCEDURE — 700105 HCHG RX REV CODE 258: Performed by: HOSPITALIST

## 2022-01-01 PROCEDURE — 97535 SELF CARE MNGMENT TRAINING: CPT

## 2022-01-01 PROCEDURE — 700102 HCHG RX REV CODE 250 W/ 637 OVERRIDE(OP): Performed by: STUDENT IN AN ORGANIZED HEALTH CARE EDUCATION/TRAINING PROGRAM

## 2022-01-01 PROCEDURE — 73552 X-RAY EXAM OF FEMUR 2/>: CPT | Mod: RT

## 2022-01-01 PROCEDURE — 82803 BLOOD GASES ANY COMBINATION: CPT

## 2022-01-01 PROCEDURE — 700102 HCHG RX REV CODE 250 W/ 637 OVERRIDE(OP): Performed by: INTERNAL MEDICINE

## 2022-01-01 PROCEDURE — 99222 1ST HOSP IP/OBS MODERATE 55: CPT | Performed by: INTERNAL MEDICINE

## 2022-01-01 PROCEDURE — 72170 X-RAY EXAM OF PELVIS: CPT

## 2022-01-01 PROCEDURE — 700111 HCHG RX REV CODE 636 W/ 250 OVERRIDE (IP)

## 2022-01-01 PROCEDURE — 99285 EMERGENCY DEPT VISIT HI MDM: CPT

## 2022-01-01 PROCEDURE — 700101 HCHG RX REV CODE 250: Performed by: EMERGENCY MEDICINE

## 2022-01-01 PROCEDURE — 700117 HCHG RX CONTRAST REV CODE 255

## 2022-01-01 PROCEDURE — 94640 AIRWAY INHALATION TREATMENT: CPT

## 2022-01-01 PROCEDURE — 84145 PROCALCITONIN (PCT): CPT

## 2022-01-01 PROCEDURE — 700105 HCHG RX REV CODE 258

## 2022-01-01 PROCEDURE — 83880 ASSAY OF NATRIURETIC PEPTIDE: CPT

## 2022-01-01 PROCEDURE — 93010 ELECTROCARDIOGRAM REPORT: CPT | Performed by: INTERNAL MEDICINE

## 2022-01-01 PROCEDURE — 85027 COMPLETE CBC AUTOMATED: CPT

## 2022-01-01 PROCEDURE — 700105 HCHG RX REV CODE 258: Performed by: EMERGENCY MEDICINE

## 2022-01-01 PROCEDURE — 93005 ELECTROCARDIOGRAM TRACING: CPT

## 2022-01-01 PROCEDURE — 92610 EVALUATE SWALLOWING FUNCTION: CPT

## 2022-01-01 PROCEDURE — 96367 TX/PROPH/DG ADDL SEQ IV INF: CPT

## 2022-01-01 PROCEDURE — 770020 HCHG ROOM/CARE - TELE (206)

## 2022-01-01 PROCEDURE — 93306 TTE W/DOPPLER COMPLETE: CPT

## 2022-01-01 PROCEDURE — 93005 ELECTROCARDIOGRAM TRACING: CPT | Performed by: HOSPITALIST

## 2022-01-01 PROCEDURE — 74230 X-RAY XM SWLNG FUNCJ C+: CPT

## 2022-01-01 PROCEDURE — 80061 LIPID PANEL: CPT

## 2022-01-01 PROCEDURE — 700101 HCHG RX REV CODE 250

## 2022-01-01 PROCEDURE — 93306 TTE W/DOPPLER COMPLETE: CPT | Mod: 26 | Performed by: INTERNAL MEDICINE

## 2022-01-01 PROCEDURE — C9803 HOPD COVID-19 SPEC COLLECT: HCPCS | Performed by: EMERGENCY MEDICINE

## 2022-01-01 PROCEDURE — 99497 ADVNCD CARE PLAN 30 MIN: CPT | Performed by: NURSE PRACTITIONER

## 2022-01-01 PROCEDURE — 700111 HCHG RX REV CODE 636 W/ 250 OVERRIDE (IP): Performed by: EMERGENCY MEDICINE

## 2022-01-01 PROCEDURE — 97530 THERAPEUTIC ACTIVITIES: CPT

## 2022-01-01 PROCEDURE — 85007 BL SMEAR W/DIFF WBC COUNT: CPT

## 2022-01-01 PROCEDURE — 93005 ELECTROCARDIOGRAM TRACING: CPT | Performed by: EMERGENCY MEDICINE

## 2022-01-01 PROCEDURE — 84132 ASSAY OF SERUM POTASSIUM: CPT

## 2022-01-01 PROCEDURE — 85025 COMPLETE CBC W/AUTO DIFF WBC: CPT

## 2022-01-01 PROCEDURE — 83605 ASSAY OF LACTIC ACID: CPT | Mod: 91

## 2022-01-01 PROCEDURE — 99238 HOSP IP/OBS DSCHRG MGMT 30/<: CPT | Mod: GC | Performed by: HOSPITALIST

## 2022-01-01 PROCEDURE — 87040 BLOOD CULTURE FOR BACTERIA: CPT | Mod: 91

## 2022-01-01 PROCEDURE — 84484 ASSAY OF TROPONIN QUANT: CPT

## 2022-01-01 PROCEDURE — 99223 1ST HOSP IP/OBS HIGH 75: CPT | Mod: AI | Performed by: INTERNAL MEDICINE

## 2022-01-01 PROCEDURE — 87070 CULTURE OTHR SPECIMN AEROBIC: CPT

## 2022-01-01 PROCEDURE — 70450 CT HEAD/BRAIN W/O DYE: CPT | Mod: ME

## 2022-01-01 PROCEDURE — 87040 BLOOD CULTURE FOR BACTERIA: CPT

## 2022-01-01 PROCEDURE — 92611 MOTION FLUOROSCOPY/SWALLOW: CPT

## 2022-01-01 PROCEDURE — 96375 TX/PRO/DX INJ NEW DRUG ADDON: CPT

## 2022-01-01 PROCEDURE — 87205 SMEAR GRAM STAIN: CPT

## 2022-01-01 PROCEDURE — 71045 X-RAY EXAM CHEST 1 VIEW: CPT

## 2022-01-01 PROCEDURE — U0003 INFECTIOUS AGENT DETECTION BY NUCLEIC ACID (DNA OR RNA); SEVERE ACUTE RESPIRATORY SYNDROME CORONAVIRUS 2 (SARS-COV-2) (CORONAVIRUS DISEASE [COVID-19]), AMPLIFIED PROBE TECHNIQUE, MAKING USE OF HIGH THROUGHPUT TECHNOLOGIES AS DESCRIBED BY CMS-2020-01-R: HCPCS

## 2022-01-01 PROCEDURE — 99497 ADVNCD CARE PLAN 30 MIN: CPT | Mod: GC | Performed by: HOSPITALIST

## 2022-01-01 PROCEDURE — 97162 PT EVAL MOD COMPLEX 30 MIN: CPT

## 2022-01-01 PROCEDURE — 0241U HCHG SARS-COV-2 COVID-19 NFCT DS RESP RNA 4 TRGT MIC: CPT

## 2022-01-01 RX ORDER — AZITHROMYCIN 250 MG/1
500 TABLET, FILM COATED ORAL DAILY
Status: DISCONTINUED | OUTPATIENT
Start: 2022-01-01 | End: 2022-01-01

## 2022-01-01 RX ORDER — QUETIAPINE FUMARATE 25 MG/1
50 TABLET, FILM COATED ORAL NIGHTLY
Status: DISCONTINUED | OUTPATIENT
Start: 2022-01-01 | End: 2022-02-07 | Stop reason: HOSPADM

## 2022-01-01 RX ORDER — ISOSORBIDE MONONITRATE 30 MG/1
30 TABLET, EXTENDED RELEASE ORAL EVERY MORNING
Status: DISCONTINUED | OUTPATIENT
Start: 2022-01-01 | End: 2022-02-07 | Stop reason: HOSPADM

## 2022-01-01 RX ORDER — ASPIRIN 81 MG/1
243 TABLET, CHEWABLE ORAL ONCE
Status: ACTIVE | OUTPATIENT
Start: 2022-01-01 | End: 2022-01-01

## 2022-01-01 RX ORDER — CARVEDILOL 6.25 MG/1
6.25 TABLET ORAL 2 TIMES DAILY WITH MEALS
Status: DISCONTINUED | OUTPATIENT
Start: 2022-01-01 | End: 2022-01-01

## 2022-01-01 RX ORDER — BISACODYL 10 MG
10 SUPPOSITORY, RECTAL RECTAL
Status: DISCONTINUED | OUTPATIENT
Start: 2022-01-01 | End: 2022-02-07 | Stop reason: HOSPADM

## 2022-01-01 RX ORDER — LEVOFLOXACIN 750 MG/1
750 TABLET, FILM COATED ORAL DAILY
Status: DISCONTINUED | OUTPATIENT
Start: 2022-01-01 | End: 2022-02-07 | Stop reason: HOSPADM

## 2022-01-01 RX ORDER — CARVEDILOL 3.12 MG/1
3.12 TABLET ORAL 2 TIMES DAILY WITH MEALS
Status: DISCONTINUED | OUTPATIENT
Start: 2022-01-01 | End: 2022-02-07 | Stop reason: HOSPADM

## 2022-01-01 RX ORDER — SIMVASTATIN 20 MG
20 TABLET ORAL NIGHTLY
Status: DISCONTINUED | OUTPATIENT
Start: 2022-01-01 | End: 2022-02-07 | Stop reason: HOSPADM

## 2022-01-01 RX ORDER — AMOXICILLIN 250 MG
2 CAPSULE ORAL 2 TIMES DAILY
Status: DISCONTINUED | OUTPATIENT
Start: 2022-01-01 | End: 2022-02-07 | Stop reason: HOSPADM

## 2022-01-01 RX ORDER — MAGNESIUM SULFATE HEPTAHYDRATE 40 MG/ML
4 INJECTION, SOLUTION INTRAVENOUS ONCE
Status: DISCONTINUED | OUTPATIENT
Start: 2022-01-01 | End: 2022-01-01

## 2022-01-01 RX ORDER — AMOXICILLIN AND CLAVULANATE POTASSIUM 500; 125 MG/1; MG/1
1 TABLET, FILM COATED ORAL EVERY 8 HOURS
Status: DISCONTINUED | OUTPATIENT
Start: 2022-01-01 | End: 2022-01-01

## 2022-01-01 RX ORDER — METOCLOPRAMIDE 10 MG/1
10 TABLET ORAL
Status: CANCELLED | OUTPATIENT
Start: 2022-01-01

## 2022-01-01 RX ORDER — OXYBUTYNIN CHLORIDE 5 MG/1
5 TABLET ORAL EVERY EVENING
COMMUNITY

## 2022-01-01 RX ORDER — AZITHROMYCIN 500 MG/5ML
500 INJECTION, POWDER, LYOPHILIZED, FOR SOLUTION INTRAVENOUS EVERY 24 HOURS
Status: DISCONTINUED | OUTPATIENT
Start: 2022-01-01 | End: 2022-01-01

## 2022-01-01 RX ORDER — ONDANSETRON 2 MG/ML
4 INJECTION INTRAMUSCULAR; INTRAVENOUS EVERY 4 HOURS PRN
Status: DISCONTINUED | OUTPATIENT
Start: 2022-01-01 | End: 2022-01-01

## 2022-01-01 RX ORDER — ALBUTEROL SULFATE 90 UG/1
2 AEROSOL, METERED RESPIRATORY (INHALATION)
Status: DISCONTINUED | OUTPATIENT
Start: 2022-01-01 | End: 2022-02-07 | Stop reason: HOSPADM

## 2022-01-01 RX ORDER — LISINOPRIL 20 MG/1
20 TABLET ORAL 2 TIMES DAILY
Status: DISCONTINUED | OUTPATIENT
Start: 2022-01-01 | End: 2022-02-07 | Stop reason: HOSPADM

## 2022-01-01 RX ORDER — FOLIC ACID 1 MG/1
1 TABLET ORAL DAILY
Status: DISCONTINUED | OUTPATIENT
Start: 2022-01-01 | End: 2022-02-07 | Stop reason: HOSPADM

## 2022-01-01 RX ORDER — LABETALOL HYDROCHLORIDE 5 MG/ML
10 INJECTION, SOLUTION INTRAVENOUS EVERY 4 HOURS PRN
Status: DISCONTINUED | OUTPATIENT
Start: 2022-01-01 | End: 2022-02-07 | Stop reason: HOSPADM

## 2022-01-01 RX ORDER — DEXTROSE MONOHYDRATE, SODIUM CHLORIDE, AND POTASSIUM CHLORIDE 50; 1.49; 4.5 G/1000ML; G/1000ML; G/1000ML
INJECTION, SOLUTION INTRAVENOUS CONTINUOUS
Status: DISCONTINUED | OUTPATIENT
Start: 2022-01-01 | End: 2022-01-01

## 2022-01-01 RX ORDER — VITAMIN B COMPLEX
1000 TABLET ORAL DAILY
COMMUNITY

## 2022-01-01 RX ORDER — POLYETHYLENE GLYCOL 3350 17 G/17G
1 POWDER, FOR SOLUTION ORAL
Status: DISCONTINUED | OUTPATIENT
Start: 2022-01-01 | End: 2022-02-07 | Stop reason: HOSPADM

## 2022-01-01 RX ORDER — AMOXICILLIN AND CLAVULANATE POTASSIUM 875; 125 MG/1; MG/1
1 TABLET, FILM COATED ORAL EVERY 12 HOURS
Status: DISCONTINUED | OUTPATIENT
Start: 2022-01-01 | End: 2022-01-01

## 2022-01-01 RX ORDER — POTASSIUM CHLORIDE 20 MEQ/1
40 TABLET, EXTENDED RELEASE ORAL ONCE
Status: COMPLETED | OUTPATIENT
Start: 2022-01-01 | End: 2022-01-01

## 2022-01-01 RX ORDER — ALBUTEROL SULFATE 90 UG/1
2 AEROSOL, METERED RESPIRATORY (INHALATION) EVERY 6 HOURS PRN
Status: DISCONTINUED | OUTPATIENT
Start: 2022-01-01 | End: 2022-01-01

## 2022-01-01 RX ORDER — ACETAMINOPHEN 325 MG/1
650 TABLET ORAL EVERY 6 HOURS PRN
Status: DISCONTINUED | OUTPATIENT
Start: 2022-01-01 | End: 2022-02-07 | Stop reason: HOSPADM

## 2022-01-01 RX ORDER — SCOLOPAMINE TRANSDERMAL SYSTEM 1 MG/1
1 PATCH, EXTENDED RELEASE TRANSDERMAL ONCE
Status: COMPLETED | OUTPATIENT
Start: 2022-01-01 | End: 2022-01-01

## 2022-01-01 RX ORDER — CHOLECALCIFEROL (VITAMIN D3) 125 MCG
500 CAPSULE ORAL DAILY
COMMUNITY

## 2022-01-01 RX ORDER — LEVOFLOXACIN 750 MG/1
750 TABLET, FILM COATED ORAL
Status: DISCONTINUED | OUTPATIENT
Start: 2022-01-01 | End: 2022-01-01

## 2022-01-01 RX ORDER — FUROSEMIDE 10 MG/ML
40 INJECTION INTRAMUSCULAR; INTRAVENOUS
Status: DISCONTINUED | OUTPATIENT
Start: 2022-01-01 | End: 2022-01-01

## 2022-01-01 RX ORDER — SODIUM CHLORIDE, SODIUM LACTATE, POTASSIUM CHLORIDE, CALCIUM CHLORIDE 600; 310; 30; 20 MG/100ML; MG/100ML; MG/100ML; MG/100ML
1000 INJECTION, SOLUTION INTRAVENOUS ONCE
Status: COMPLETED | OUTPATIENT
Start: 2022-01-01 | End: 2022-01-01

## 2022-01-01 RX ORDER — BUSPIRONE HYDROCHLORIDE 10 MG/1
5 TABLET ORAL 2 TIMES DAILY
Status: DISCONTINUED | OUTPATIENT
Start: 2022-01-01 | End: 2022-02-07 | Stop reason: HOSPADM

## 2022-01-01 RX ORDER — SODIUM CHLORIDE 9 MG/ML
1000 INJECTION, SOLUTION INTRAVENOUS ONCE
Status: COMPLETED | OUTPATIENT
Start: 2022-01-01 | End: 2022-01-01

## 2022-01-01 RX ORDER — AZITHROMYCIN 500 MG/1
500 INJECTION, POWDER, LYOPHILIZED, FOR SOLUTION INTRAVENOUS ONCE
Status: COMPLETED | OUTPATIENT
Start: 2022-01-01 | End: 2022-01-01

## 2022-01-01 RX ORDER — ONDANSETRON 4 MG/1
4 TABLET, ORALLY DISINTEGRATING ORAL EVERY 4 HOURS PRN
Status: DISCONTINUED | OUTPATIENT
Start: 2022-01-01 | End: 2022-01-01

## 2022-01-01 RX ADMIN — BUSPIRONE HYDROCHLORIDE 5 MG: 10 TABLET ORAL at 16:58

## 2022-01-01 RX ADMIN — LISINOPRIL 20 MG: 20 TABLET ORAL at 05:36

## 2022-01-01 RX ADMIN — AZITHROMYCIN MONOHYDRATE 500 MG: 250 TABLET ORAL at 05:48

## 2022-01-01 RX ADMIN — BUSPIRONE HYDROCHLORIDE 5 MG: 10 TABLET ORAL at 06:24

## 2022-01-01 RX ADMIN — THERA TABS 1 TABLET: TAB at 06:23

## 2022-01-01 RX ADMIN — CARVEDILOL 3.12 MG: 3.12 TABLET, FILM COATED ORAL at 05:46

## 2022-01-01 RX ADMIN — FOLIC ACID 1 MG: 1 TABLET ORAL at 05:40

## 2022-01-01 RX ADMIN — LEVOFLOXACIN 750 MG: 750 TABLET, FILM COATED ORAL at 05:36

## 2022-01-01 RX ADMIN — LEVOFLOXACIN 750 MG: 750 TABLET, FILM COATED ORAL at 05:42

## 2022-01-01 RX ADMIN — THIAMINE HYDROCHLORIDE 100 MG: 100 INJECTION, SOLUTION INTRAMUSCULAR; INTRAVENOUS at 10:06

## 2022-01-01 RX ADMIN — CEFTRIAXONE SODIUM 1 G: 10 INJECTION, POWDER, FOR SOLUTION INTRAVENOUS at 18:52

## 2022-01-01 RX ADMIN — CARVEDILOL 3.12 MG: 3.12 TABLET, FILM COATED ORAL at 07:30

## 2022-01-01 RX ADMIN — LISINOPRIL 20 MG: 20 TABLET ORAL at 05:47

## 2022-01-01 RX ADMIN — THIAMINE HYDROCHLORIDE 100 MG: 100 INJECTION, SOLUTION INTRAMUSCULAR; INTRAVENOUS at 06:05

## 2022-01-01 RX ADMIN — LISINOPRIL 20 MG: 20 TABLET ORAL at 22:00

## 2022-01-01 RX ADMIN — ISOSORBIDE MONONITRATE 30 MG: 30 TABLET, EXTENDED RELEASE ORAL at 06:23

## 2022-01-01 RX ADMIN — LISINOPRIL 20 MG: 20 TABLET ORAL at 05:42

## 2022-01-01 RX ADMIN — DOCUSATE SODIUM 50 MG AND SENNOSIDES 8.6 MG 2 TABLET: 8.6; 5 TABLET, FILM COATED ORAL at 06:23

## 2022-01-01 RX ADMIN — BUSPIRONE HYDROCHLORIDE 5 MG: 10 TABLET ORAL at 17:38

## 2022-01-01 RX ADMIN — LEVOFLOXACIN 750 MG: 750 TABLET, FILM COATED ORAL at 05:00

## 2022-01-01 RX ADMIN — ASPIRIN 81 MG: 81 TABLET, COATED ORAL at 06:34

## 2022-01-01 RX ADMIN — SODIUM CHLORIDE 3 G: 900 INJECTION INTRAVENOUS at 15:34

## 2022-01-01 RX ADMIN — SODIUM CHLORIDE, POTASSIUM CHLORIDE, SODIUM LACTATE AND CALCIUM CHLORIDE 1000 ML: 600; 310; 30; 20 INJECTION, SOLUTION INTRAVENOUS at 16:21

## 2022-01-01 RX ADMIN — LEVOFLOXACIN 750 MG: 750 TABLET, FILM COATED ORAL at 05:39

## 2022-01-01 RX ADMIN — THERA TABS 1 TABLET: TAB at 05:40

## 2022-01-01 RX ADMIN — POTASSIUM CHLORIDE, DEXTROSE MONOHYDRATE AND SODIUM CHLORIDE: 150; 5; 450 INJECTION, SOLUTION INTRAVENOUS at 11:11

## 2022-01-01 RX ADMIN — LISINOPRIL 20 MG: 20 TABLET ORAL at 06:37

## 2022-01-01 RX ADMIN — DOCUSATE SODIUM 50 MG AND SENNOSIDES 8.6 MG 2 TABLET: 8.6; 5 TABLET, FILM COATED ORAL at 05:36

## 2022-01-01 RX ADMIN — LISINOPRIL 20 MG: 20 TABLET ORAL at 16:46

## 2022-01-01 RX ADMIN — LISINOPRIL 20 MG: 20 TABLET ORAL at 17:45

## 2022-01-01 RX ADMIN — SODIUM CHLORIDE 1000 ML: 9 INJECTION, SOLUTION INTRAVENOUS at 10:10

## 2022-01-01 RX ADMIN — BUSPIRONE HYDROCHLORIDE 5 MG: 10 TABLET ORAL at 04:59

## 2022-01-01 RX ADMIN — ASPIRIN 81 MG: 81 TABLET, COATED ORAL at 06:23

## 2022-01-01 RX ADMIN — LISINOPRIL 20 MG: 20 TABLET ORAL at 17:57

## 2022-01-01 RX ADMIN — CEFEPIME 2 G: 2 INJECTION, POWDER, FOR SOLUTION INTRAVENOUS at 06:01

## 2022-01-01 RX ADMIN — CARVEDILOL 3.12 MG: 3.12 TABLET, FILM COATED ORAL at 18:55

## 2022-01-01 RX ADMIN — SODIUM CHLORIDE 3 G: 900 INJECTION INTRAVENOUS at 18:06

## 2022-01-01 RX ADMIN — THERA TABS 1 TABLET: TAB at 05:36

## 2022-01-01 RX ADMIN — CARVEDILOL 3.12 MG: 3.12 TABLET, FILM COATED ORAL at 17:38

## 2022-01-01 RX ADMIN — SIMVASTATIN 20 MG: 20 TABLET, FILM COATED ORAL at 21:57

## 2022-01-01 RX ADMIN — AZITHROMYCIN MONOHYDRATE 500 MG: 500 INJECTION, POWDER, LYOPHILIZED, FOR SOLUTION INTRAVENOUS at 18:52

## 2022-01-01 RX ADMIN — DOXYCYCLINE 100 MG: 100 INJECTION, POWDER, LYOPHILIZED, FOR SOLUTION INTRAVENOUS at 17:09

## 2022-01-01 RX ADMIN — LEVOFLOXACIN 750 MG: 750 TABLET, FILM COATED ORAL at 06:37

## 2022-01-01 RX ADMIN — SIMVASTATIN 20 MG: 20 TABLET, FILM COATED ORAL at 22:01

## 2022-01-01 RX ADMIN — CARVEDILOL 3.12 MG: 3.12 TABLET, FILM COATED ORAL at 06:50

## 2022-01-01 RX ADMIN — CEFTRIAXONE SODIUM 2 G: 10 INJECTION, POWDER, FOR SOLUTION INTRAVENOUS at 11:11

## 2022-01-01 RX ADMIN — BUSPIRONE HYDROCHLORIDE 5 MG: 10 TABLET ORAL at 17:57

## 2022-01-01 RX ADMIN — BUSPIRONE HYDROCHLORIDE 5 MG: 10 TABLET ORAL at 18:36

## 2022-01-01 RX ADMIN — SIMVASTATIN 20 MG: 20 TABLET, FILM COATED ORAL at 21:10

## 2022-01-01 RX ADMIN — HUMAN ALBUMIN MICROSPHERES AND PERFLUTREN 3 ML: 10; .22 INJECTION, SOLUTION INTRAVENOUS at 11:45

## 2022-01-01 RX ADMIN — BUSPIRONE HYDROCHLORIDE 5 MG: 10 TABLET ORAL at 17:44

## 2022-01-01 RX ADMIN — DOCUSATE SODIUM 50 MG AND SENNOSIDES 8.6 MG 2 TABLET: 8.6; 5 TABLET, FILM COATED ORAL at 06:40

## 2022-01-01 RX ADMIN — ASPIRIN 81 MG: 81 TABLET, COATED ORAL at 05:40

## 2022-01-01 RX ADMIN — BUSPIRONE HYDROCHLORIDE 5 MG: 10 TABLET ORAL at 05:45

## 2022-01-01 RX ADMIN — BUSPIRONE HYDROCHLORIDE 5 MG: 10 TABLET ORAL at 05:48

## 2022-01-01 RX ADMIN — FUROSEMIDE 40 MG: 10 INJECTION, SOLUTION INTRAMUSCULAR; INTRAVENOUS at 08:35

## 2022-01-01 RX ADMIN — ASPIRIN 81 MG: 81 TABLET, COATED ORAL at 06:00

## 2022-01-01 RX ADMIN — DOXYCYCLINE 100 MG: 100 INJECTION, POWDER, LYOPHILIZED, FOR SOLUTION INTRAVENOUS at 08:35

## 2022-01-01 RX ADMIN — ASPIRIN 81 MG: 81 TABLET, COATED ORAL at 05:50

## 2022-01-01 RX ADMIN — ACETAMINOPHEN 650 MG: 325 TABLET, FILM COATED ORAL at 21:59

## 2022-01-01 RX ADMIN — BUSPIRONE HYDROCHLORIDE 5 MG: 10 TABLET ORAL at 05:40

## 2022-01-01 RX ADMIN — LISINOPRIL 20 MG: 20 TABLET ORAL at 18:36

## 2022-01-01 RX ADMIN — ASPIRIN 81 MG: 81 TABLET, COATED ORAL at 05:46

## 2022-01-01 RX ADMIN — QUETIAPINE FUMARATE 50 MG: 25 TABLET ORAL at 22:04

## 2022-01-01 RX ADMIN — QUETIAPINE FUMARATE 50 MG: 25 TABLET ORAL at 21:10

## 2022-01-01 RX ADMIN — CARVEDILOL 3.12 MG: 3.12 TABLET, FILM COATED ORAL at 08:05

## 2022-01-01 RX ADMIN — FOLIC ACID 1 MG: 1 TABLET ORAL at 06:23

## 2022-01-01 RX ADMIN — DOCUSATE SODIUM 50 MG AND SENNOSIDES 8.6 MG 2 TABLET: 8.6; 5 TABLET, FILM COATED ORAL at 05:44

## 2022-01-01 RX ADMIN — ISOSORBIDE MONONITRATE 30 MG: 30 TABLET, EXTENDED RELEASE ORAL at 06:35

## 2022-01-01 RX ADMIN — FOLIC ACID 1 MG: 1 TABLET ORAL at 05:44

## 2022-01-01 RX ADMIN — POTASSIUM CHLORIDE 40 MEQ: 1500 TABLET, EXTENDED RELEASE ORAL at 06:41

## 2022-01-01 RX ADMIN — BUSPIRONE HYDROCHLORIDE 5 MG: 10 TABLET ORAL at 05:37

## 2022-01-01 RX ADMIN — ISOSORBIDE MONONITRATE 30 MG: 30 TABLET, EXTENDED RELEASE ORAL at 05:42

## 2022-01-01 RX ADMIN — CARVEDILOL 3.12 MG: 3.12 TABLET, FILM COATED ORAL at 09:08

## 2022-01-01 RX ADMIN — CEFEPIME 2 G: 2 INJECTION, POWDER, FOR SOLUTION INTRAVENOUS at 17:09

## 2022-01-01 RX ADMIN — ISOSORBIDE MONONITRATE 30 MG: 30 TABLET, EXTENDED RELEASE ORAL at 05:01

## 2022-01-01 RX ADMIN — BUSPIRONE HYDROCHLORIDE 5 MG: 10 TABLET ORAL at 06:36

## 2022-01-01 RX ADMIN — BUSPIRONE HYDROCHLORIDE 5 MG: 10 TABLET ORAL at 16:45

## 2022-01-01 RX ADMIN — CARVEDILOL 3.12 MG: 3.12 TABLET, FILM COATED ORAL at 17:57

## 2022-01-01 RX ADMIN — FOLIC ACID 1 MG: 1 TABLET ORAL at 05:05

## 2022-01-01 RX ADMIN — ASPIRIN 81 MG: 81 TABLET, COATED ORAL at 05:36

## 2022-01-01 RX ADMIN — ISOSORBIDE MONONITRATE 30 MG: 30 TABLET, EXTENDED RELEASE ORAL at 05:49

## 2022-01-01 RX ADMIN — ISOSORBIDE MONONITRATE 30 MG: 30 TABLET, EXTENDED RELEASE ORAL at 05:45

## 2022-01-01 RX ADMIN — CARVEDILOL 3.12 MG: 3.12 TABLET, FILM COATED ORAL at 06:34

## 2022-01-01 RX ADMIN — CARVEDILOL 3.12 MG: 3.12 TABLET, FILM COATED ORAL at 18:36

## 2022-01-01 RX ADMIN — CARVEDILOL 3.12 MG: 3.12 TABLET, FILM COATED ORAL at 16:58

## 2022-01-01 RX ADMIN — IPRATROPIUM BROMIDE 0.5 MG: 0.5 SOLUTION RESPIRATORY (INHALATION) at 15:13

## 2022-01-01 RX ADMIN — LISINOPRIL 20 MG: 20 TABLET ORAL at 18:52

## 2022-01-01 RX ADMIN — LISINOPRIL 20 MG: 20 TABLET ORAL at 16:58

## 2022-01-01 RX ADMIN — THERA TABS 1 TABLET: TAB at 05:43

## 2022-01-01 RX ADMIN — THERA TABS 1 TABLET: TAB at 05:05

## 2022-01-01 RX ADMIN — LISINOPRIL 20 MG: 20 TABLET ORAL at 05:40

## 2022-01-01 RX ADMIN — BUSPIRONE HYDROCHLORIDE 5 MG: 10 TABLET ORAL at 18:52

## 2022-01-01 RX ADMIN — DOXYCYCLINE 100 MG: 100 INJECTION, POWDER, LYOPHILIZED, FOR SOLUTION INTRAVENOUS at 07:17

## 2022-01-01 RX ADMIN — ALBUTEROL SULFATE 2.5 MG: 2.5 SOLUTION RESPIRATORY (INHALATION) at 15:12

## 2022-01-01 RX ADMIN — LISINOPRIL 20 MG: 20 TABLET ORAL at 17:38

## 2022-01-01 RX ADMIN — CARVEDILOL 3.12 MG: 3.12 TABLET, FILM COATED ORAL at 08:44

## 2022-01-01 RX ADMIN — LEVOFLOXACIN 750 MG: 750 TABLET, FILM COATED ORAL at 06:28

## 2022-01-01 RX ADMIN — FOLIC ACID 1 MG: 1 TABLET ORAL at 05:36

## 2022-01-01 RX ADMIN — CARVEDILOL 3.12 MG: 3.12 TABLET, FILM COATED ORAL at 17:45

## 2022-01-01 RX ADMIN — DOCUSATE SODIUM 50 MG AND SENNOSIDES 8.6 MG 2 TABLET: 8.6; 5 TABLET, FILM COATED ORAL at 17:57

## 2022-01-01 RX ADMIN — SIMVASTATIN 20 MG: 20 TABLET, FILM COATED ORAL at 22:04

## 2022-01-01 RX ADMIN — FOLIC ACID 1 MG: 1 TABLET ORAL at 06:38

## 2022-01-01 RX ADMIN — POTASSIUM CHLORIDE 40 MEQ: 1500 TABLET, EXTENDED RELEASE ORAL at 09:08

## 2022-01-01 RX ADMIN — DOCUSATE SODIUM 50 MG AND SENNOSIDES 8.6 MG 2 TABLET: 8.6; 5 TABLET, FILM COATED ORAL at 05:40

## 2022-01-01 RX ADMIN — THERA TABS 1 TABLET: TAB at 06:40

## 2022-01-01 RX ADMIN — SCOPALAMINE 1 PATCH: 1 PATCH, EXTENDED RELEASE TRANSDERMAL at 13:06

## 2022-01-01 RX ADMIN — LEVOFLOXACIN 750 MG: 750 TABLET, FILM COATED ORAL at 10:11

## 2022-01-01 RX ADMIN — LISINOPRIL 20 MG: 20 TABLET ORAL at 06:24

## 2022-01-01 RX ADMIN — LISINOPRIL 20 MG: 20 TABLET ORAL at 05:05

## 2022-01-01 RX ADMIN — CARVEDILOL 3.12 MG: 3.12 TABLET, FILM COATED ORAL at 16:45

## 2022-01-01 RX ADMIN — ISOSORBIDE MONONITRATE 30 MG: 30 TABLET, EXTENDED RELEASE ORAL at 05:39

## 2022-01-01 RX ADMIN — DOCUSATE SODIUM 50 MG AND SENNOSIDES 8.6 MG 2 TABLET: 8.6; 5 TABLET, FILM COATED ORAL at 17:37

## 2022-01-01 ASSESSMENT — COGNITIVE AND FUNCTIONAL STATUS - GENERAL
DAILY ACTIVITIY SCORE: 13
DRESSING REGULAR LOWER BODY CLOTHING: A LITTLE
EATING MEALS: A LITTLE
DRESSING REGULAR LOWER BODY CLOTHING: A LITTLE
MOVING FROM LYING ON BACK TO SITTING ON SIDE OF FLAT BED: UNABLE
SUGGESTED CMS G CODE MODIFIER MOBILITY: CM
DRESSING REGULAR UPPER BODY CLOTHING: A LITTLE
MOVING TO AND FROM BED TO CHAIR: A LOT
DAILY ACTIVITIY SCORE: 18
SUGGESTED CMS G CODE MODIFIER DAILY ACTIVITY: CK
DRESSING REGULAR UPPER BODY CLOTHING: A LOT
SUGGESTED CMS G CODE MODIFIER DAILY ACTIVITY: CL
MOVING FROM LYING ON BACK TO SITTING ON SIDE OF FLAT BED: UNABLE
DAILY ACTIVITIY SCORE: 18
PERSONAL GROOMING: A LITTLE
CLIMB 3 TO 5 STEPS WITH RAILING: TOTAL
MOBILITY SCORE: 8
HELP NEEDED FOR BATHING: A LITTLE
TURNING FROM BACK TO SIDE WHILE IN FLAT BAD: A LITTLE
DRESSING REGULAR UPPER BODY CLOTHING: A LITTLE
TURNING FROM BACK TO SIDE WHILE IN FLAT BAD: A LOT
STANDING UP FROM CHAIR USING ARMS: TOTAL
WALKING IN HOSPITAL ROOM: TOTAL
HELP NEEDED FOR BATHING: A LOT
PERSONAL GROOMING: A LOT
MOVING TO AND FROM BED TO CHAIR: UNABLE
SUGGESTED CMS G CODE MODIFIER DAILY ACTIVITY: CK
HELP NEEDED FOR BATHING: A LOT
DRESSING REGULAR LOWER BODY CLOTHING: A LOT
PERSONAL GROOMING: A LITTLE
MOBILITY SCORE: 9
WALKING IN HOSPITAL ROOM: TOTAL
TOILETING: A LOT
TOILETING: A LITTLE
SUGGESTED CMS G CODE MODIFIER MOBILITY: CM
EATING MEALS: A LITTLE
TOILETING: A LITTLE
CLIMB 3 TO 5 STEPS WITH RAILING: TOTAL
STANDING UP FROM CHAIR USING ARMS: A LOT

## 2022-01-01 ASSESSMENT — ENCOUNTER SYMPTOMS
DOUBLE VISION: 0
HEMOPTYSIS: 0
COUGH: 1
DEPRESSION: 0
FEVER: 0
COUGH: 1
DOUBLE VISION: 0
SINUS PAIN: 0
BRUISES/BLEEDS EASILY: 0
PALPITATIONS: 0
PALPITATIONS: 0
DEPRESSION: 0
HEMOPTYSIS: 0
PALPITATIONS: 0
BRUISES/BLEEDS EASILY: 0
TINGLING: 0
MYALGIAS: 0
PALPITATIONS: 0
DEPRESSION: 0
NAUSEA: 0
CHILLS: 0
DOUBLE VISION: 0
BLURRED VISION: 0
HEARTBURN: 0
HEMOPTYSIS: 0
SHORTNESS OF BREATH: 1
DOUBLE VISION: 0
PALPITATIONS: 0
VOMITING: 0
DOUBLE VISION: 0
MYALGIAS: 0
NAUSEA: 0
FEVER: 0
TINGLING: 0
ORTHOPNEA: 0
BLURRED VISION: 0
BRUISES/BLEEDS EASILY: 0
DIARRHEA: 0
MYALGIAS: 0
BRUISES/BLEEDS EASILY: 0
CHILLS: 0
NAUSEA: 0
HEADACHES: 0
CHILLS: 0
COUGH: 0
PALPITATIONS: 0
DEPRESSION: 0
TINGLING: 0
SPUTUM PRODUCTION: 1
NECK PAIN: 0
NAUSEA: 0
DOUBLE VISION: 0
CHILLS: 0
MYALGIAS: 0
FEVER: 1
HEADACHES: 0
VOMITING: 0
PALPITATIONS: 0
WEIGHT LOSS: 0
TINGLING: 0
NAUSEA: 0
BLURRED VISION: 0
TINGLING: 0
HEADACHES: 0
DIZZINESS: 0
DOUBLE VISION: 0
SHORTNESS OF BREATH: 1
DEPRESSION: 0
COUGH: 1
NAUSEA: 0
DIZZINESS: 0
CHEST TIGHTNESS: 0
DIZZINESS: 0
DOUBLE VISION: 0
STRIDOR: 0
ORTHOPNEA: 0
SORE THROAT: 0
NAUSEA: 0
DEPRESSION: 0
COUGH: 1
DIZZINESS: 0
SINUS PAIN: 0
SINUS PAIN: 0
CHILLS: 0
SHORTNESS OF BREATH: 1
COUGH: 1
CHOKING: 0
COUGH: 1
HEARTBURN: 0
DEPRESSION: 0
MYALGIAS: 0
NECK PAIN: 0
BLURRED VISION: 0
TINGLING: 0
FEVER: 0
ORTHOPNEA: 0
ORTHOPNEA: 0
FEVER: 0
HEARTBURN: 0
HEARTBURN: 0
CHILLS: 0
FATIGUE: 1
MYALGIAS: 0
SHORTNESS OF BREATH: 1
ORTHOPNEA: 0
SINUS PAIN: 0
INSOMNIA: 0
SHORTNESS OF BREATH: 1
MYALGIAS: 0
DIZZINESS: 0
SHORTNESS OF BREATH: 1
HEMOPTYSIS: 0
FEVER: 0
HEMOPTYSIS: 0
DEPRESSION: 0
CHILLS: 0
NECK PAIN: 0
NAUSEA: 0
COUGH: 1
BRUISES/BLEEDS EASILY: 0
MYALGIAS: 0
FEVER: 0
SHORTNESS OF BREATH: 1
HEADACHES: 0
DIZZINESS: 0
MYALGIAS: 0
ABDOMINAL PAIN: 0
APNEA: 0
BLURRED VISION: 0
HEADACHES: 0
PALPITATIONS: 0
NECK PAIN: 0
SPUTUM PRODUCTION: 1
NECK PAIN: 0
ORTHOPNEA: 0
BRUISES/BLEEDS EASILY: 0
HEARTBURN: 0
BLURRED VISION: 0
DIZZINESS: 0
FEVER: 0
HEMOPTYSIS: 0
NAUSEA: 0
HEARTBURN: 0
HEARTBURN: 0
FEVER: 0
CHILLS: 0
BLURRED VISION: 0
DOUBLE VISION: 0
TINGLING: 0
DIZZINESS: 0
SINUS PAIN: 0
COUGH: 1
DIZZINESS: 0
BLURRED VISION: 0
SPUTUM PRODUCTION: 1
NECK PAIN: 0
FEVER: 0
SHORTNESS OF BREATH: 1
SPUTUM PRODUCTION: 1
BRUISES/BLEEDS EASILY: 0
CHILLS: 0
COUGH: 0
SORE THROAT: 0
SINUS PAIN: 0
COUGH: 1
WHEEZING: 0
ORTHOPNEA: 0
HEADACHES: 0
ORTHOPNEA: 0
SPUTUM PRODUCTION: 1
HEMOPTYSIS: 0
NAUSEA: 0
HEADACHES: 0
SPUTUM PRODUCTION: 1
HEADACHES: 0
NECK PAIN: 0
SPUTUM PRODUCTION: 1
TINGLING: 0
SINUS PAIN: 0
SPUTUM PRODUCTION: 1
PALPITATIONS: 0
NECK PAIN: 0
HEADACHES: 0
DEPRESSION: 0
SINUS PAIN: 0
DIZZINESS: 0
BRUISES/BLEEDS EASILY: 0
SHORTNESS OF BREATH: 1
SHORTNESS OF BREATH: 1
HEMOPTYSIS: 0
HEARTBURN: 0
NECK PAIN: 0
BRUISES/BLEEDS EASILY: 0
HEMOPTYSIS: 0
BLURRED VISION: 0
MYALGIAS: 0
HEADACHES: 0

## 2022-01-01 ASSESSMENT — LIFESTYLE VARIABLES
HAVE PEOPLE ANNOYED YOU BY CRITICIZING YOUR DRINKING: YES
EVER HAD A DRINK FIRST THING IN THE MORNING TO STEADY YOUR NERVES TO GET RID OF A HANGOVER: YES
ALCOHOL_USE: NO
TOTAL SCORE: 4
EVER FELT BAD OR GUILTY ABOUT YOUR DRINKING: YES
DOES PATIENT WANT TO STOP DRINKING: NO
HOW MANY TIMES IN THE PAST YEAR HAVE YOU HAD 5 OR MORE DRINKS IN A DAY: 0
ON A TYPICAL DAY WHEN YOU DRINK ALCOHOL HOW MANY DRINKS DO YOU HAVE: 0
HAVE YOU EVER FELT YOU SHOULD CUT DOWN ON YOUR DRINKING: YES
CONSUMPTION TOTAL: POSITIVE
AVERAGE NUMBER OF DAYS PER WEEK YOU HAVE A DRINK CONTAINING ALCOHOL: 0
TOTAL SCORE: 4
TOTAL SCORE: 4

## 2022-01-01 ASSESSMENT — PATIENT HEALTH QUESTIONNAIRE - PHQ9
8. MOVING OR SPEAKING SO SLOWLY THAT OTHER PEOPLE COULD HAVE NOTICED. OR THE OPPOSITE, BEING SO FIGETY OR RESTLESS THAT YOU HAVE BEEN MOVING AROUND A LOT MORE THAN USUAL: NEARLY EVERY DAY
6. FEELING BAD ABOUT YOURSELF - OR THAT YOU ARE A FAILURE OR HAVE LET YOURSELF OR YOUR FAMILY DOWN: NOT AL ALL
9. THOUGHTS THAT YOU WOULD BE BETTER OFF DEAD, OR OF HURTING YOURSELF: NOT AT ALL
5. POOR APPETITE OR OVEREATING: NEARLY EVERY DAY
1. LITTLE INTEREST OR PLEASURE IN DOING THINGS: NEARLY EVERY DAY
2. FEELING DOWN, DEPRESSED, IRRITABLE, OR HOPELESS: NEARLY EVERY DAY
SUM OF ALL RESPONSES TO PHQ9 QUESTIONS 1 AND 2: 6
SUM OF ALL RESPONSES TO PHQ QUESTIONS 1-9: 19
4. FEELING TIRED OR HAVING LITTLE ENERGY: NEARLY EVERY DAY
7. TROUBLE CONCENTRATING ON THINGS, SUCH AS READING THE NEWSPAPER OR WATCHING TELEVISION: SEVERAL DAYS
3. TROUBLE FALLING OR STAYING ASLEEP OR SLEEPING TOO MUCH: NEARLY EVERY DAY

## 2022-01-01 ASSESSMENT — COPD QUESTIONNAIRES
COPD SCREENING SCORE: 6
HAVE YOU SMOKED AT LEAST 100 CIGARETTES IN YOUR ENTIRE LIFE: YES
DO YOU EVER COUGH UP ANY MUCUS OR PHLEGM?: YES, A FEW DAYS A WEEK OR MONTH
DURING THE PAST 4 WEEKS HOW MUCH DID YOU FEEL SHORT OF BREATH: SOME OF THE TIME

## 2022-01-01 ASSESSMENT — PAIN DESCRIPTION - PAIN TYPE
TYPE: ACUTE PAIN

## 2022-01-01 ASSESSMENT — FIBROSIS 4 INDEX
FIB4 SCORE: 3.28
FIB4 SCORE: 3.24
FIB4 SCORE: 2.25

## 2022-01-01 ASSESSMENT — ACTIVITIES OF DAILY LIVING (ADL): TOILETING: INDEPENDENT

## 2022-01-01 ASSESSMENT — GAIT ASSESSMENTS: GAIT LEVEL OF ASSIST: UNABLE TO PARTICIPATE

## 2022-01-28 PROBLEM — E87.20 METABOLIC ACIDOSIS: Status: ACTIVE | Noted: 2022-01-01

## 2022-01-28 PROBLEM — J96.00 ACUTE RESPIRATORY FAILURE (HCC): Status: ACTIVE | Noted: 2022-01-01

## 2022-01-28 PROBLEM — A41.9 SEPSIS (HCC): Status: ACTIVE | Noted: 2022-01-01

## 2022-01-28 PROBLEM — R79.89 TROPONIN I ABOVE REFERENCE RANGE: Status: ACTIVE | Noted: 2022-01-01

## 2022-01-28 PROBLEM — D75.829 HIT (HEPARIN-INDUCED THROMBOCYTOPENIA) (HCC): Status: ACTIVE | Noted: 2022-01-01

## 2022-01-28 NOTE — ED PROVIDER NOTES
"ED Provider Note    Scribed for Ruben Dueñas M.D. by Dulce Reese. 1/28/2022, 2:17 PM.    Primary care provider: Jonnathan Hilario M.D.  Means of arrival: EMS  History obtained from: Patient  History limited by: DNR paperwork at bedside    CHIEF COMPLAINT  Chief Complaint   Patient presents with    Chest Pain     r sided cp. pain does not radiate. began 2 days ago when he was in bed. 3/10 pain.    Shortness of Breath     pt 80% O2 per EMS. sob began 2 weeks ago.     PPE Note: I personally donned full PPE for all patient encounters during this visit, including wearing an N95 respirator mask, gloves, and eye protection. Scribe remained outside the patient's room and did not have any contact with the patient for the duration of patient encounter.      CHICA Medina is a 81 y.o. male who presents to the Emergency Department with right-sided chest pain onset 2 days ago. He is additionally experiencing shortness of breath onset 2 weeks ago and right hip pain onset 2 days ago. He reports the pain radiates down his thigh when he moves his hip. His pain is exacerbated with movement of the hip. He denies any recent falls or trauma to the area. He reports using an albuterol inhaler at home but denies using oxygen at home. He denies chest pain, fever, chills, abdominal pain, nausea, and vomiting. He reports he is fully vaccinated against COVID-19.     The patient has DNR paperwork at bedside which states he would like, \"focussed treatment\" only.     REVIEW OF SYSTEMS  As above otherwise all other systems are negative    PAST MEDICAL HISTORY   has a past medical history of ACS (acute coronary syndrome) (HCC) (2/27/2013), Acute inferior myocardial infarction (HCC) (3/20/2013), Anemia (3/19/2013), CAD (coronary artery disease) (3/11/2013), Cardiac arrest (HCC) (3/11/2013), ETOH abuse, Hyperlipidemia (3/11/2013), Hypertension, Ischemic cardiomyopathy (3/11/2013), and Smoker.    SURGICAL HISTORY   has a past surgical history " "that includes appendectomy; gastroscopy-endo (3/22/2013); colonoscopy - endo (3/22/2013); zzz cardiac cath; and angioplasty.    SOCIAL HISTORY  Social History     Tobacco Use    Smoking status: Current Every Day Smoker     Packs/day: 1.00     Types: Cigarettes    Smokeless tobacco: Never Used   Vaping Use    Vaping Use: Never used   Substance Use Topics    Alcohol use: Not Currently    Drug use: No      Social History     Substance and Sexual Activity   Drug Use No       FAMILY HISTORY  Family History   Problem Relation Age of Onset    Heart Attack Father        CURRENT MEDICATIONS  Home Medications       Reviewed by Lula Mittal (Pharmacy Tech) on 01/28/22 at 1824  Med List Status: Complete     Medication Last Dose Status   albuterol (VENTOLIN OR PROVENTIL) 108 (90 BASE) MCG/ACT Aero Soln inhalation aerosol  Active   aspirin EC (ECOTRIN) 81 MG Tablet Delayed Response  Active   carvedilol (COREG) 6.25 MG Tab 1/28/2022 Active   cyanocobalamin (VITAMIN B-12) 500 MCG Tab  Active   lisinopril (PRINIVIL) 20 MG Tab 1/28/2022 Active   omeprazole (PRILOSEC) 40 MG delayed-release capsule  Active   oxybutynin (DITROPAN) 5 MG Tab  Active   sertraline (ZOLOFT) 50 MG Tab  Active   therapeutic multivitamin-minerals (THERAGRAN-M) Tab  Active   vitamin D3 (CHOLECALCIFEROL) 1000 Unit (25 mcg) Tab  Active                    ALLERGIES  Allergies   Allergen Reactions    Heparin Unspecified     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   HENT: Normocephalic, Atraumatic, Bilateral external ears normal, Dry mucous membranes, No oral exudates, Nose normal.   Eyes:conjunctiva is normal, there are no signs of exudate.   Neck: Supple, no meningeal " signs.  Lymphatic: No lymphadenopathy noted.   Cardiovascular: Diminished heart tones, Tachycardic, Regular rhythm without murmurs gallops or rubs.   Thorax & Lungs: Diminished throughout, Tachypneic. No respiratory distress. Breathing comfortably. Lungs are clear to auscultation bilaterally, there are no wheezes no rales. Chest wall is nontender.  Abdomen: Soft, nontender, nondistended. Bowel sounds are present.   Skin: Warm, Dry, No erythema,   Back: No tenderness, No CVA tenderness.  Musculoskeletal: Limited range of motion of the right hip with extreme discomfort in the right hip and femur with external rotation. No major deformities. Intact distal pulses, no clubbing, no cyanosis, no edema,   Neurologic: Alert & oriented x 3, Moving all extremities. No gross abnormalities.    Psychiatric: Affect normal, Judgment normal, Mood normal.     LABS  Results for orders placed or performed during the hospital encounter of 01/28/22   ARTERIAL BLOOD GAS w/ O2 (LAB)   Result Value Ref Range    Ph 7.48 7.40 - 7.50    Pco2 22.1 (L) 26.0 - 37.0 mmHg    Po2 60.6 (L) 64.0 - 87.0 mmHg    O2 Saturation 90.9 (L) 93.0 - 99.0 %    Hco3 16 (L) 17 - 25 mmol/L    Base Excess -5 (L) -4 - 3 mmol/L    Body Temp see below Centigrade   LACTIC ACID   Result Value Ref Range    Lactic Acid 3.0 (H) 0.5 - 2.0 mmol/L   LACTIC ACID   Result Value Ref Range    Lactic Acid 2.1 (H) 0.5 - 2.0 mmol/L   LACTIC ACID   Result Value Ref Range    Lactic Acid 2.1 (H) 0.5 - 2.0 mmol/L   CBC WITH DIFFERENTIAL   Result Value Ref Range    WBC 14.6 (H) 4.8 - 10.8 K/uL    RBC 4.18 (L) 4.70 - 6.10 M/uL    Hemoglobin 13.5 (L) 14.0 - 18.0 g/dL    Hematocrit 40.2 (L) 42.0 - 52.0 %    MCV 96.2 81.4 - 97.8 fL    MCH 32.3 27.0 - 33.0 pg    MCHC 33.6 (L) 33.7 - 35.3 g/dL    RDW 50.1 (H) 35.9 - 50.0 fL    Platelet Count 186 164 - 446 K/uL    MPV 11.0 9.0 - 12.9 fL    Neutrophils-Polys 86.90 (H) 44.00 - 72.00 %    Lymphocytes 6.10 (L) 22.00 - 41.00 %    Monocytes 7.00 0.00  - 13.40 %    Eosinophils 0.00 0.00 - 6.90 %    Basophils 0.00 0.00 - 1.80 %    Nucleated RBC 0.00 /100 WBC    Neutrophils (Absolute) 12.69 (H) 1.82 - 7.42 K/uL    Lymphs (Absolute) 0.89 (L) 1.00 - 4.80 K/uL    Monos (Absolute) 1.02 (H) 0.00 - 0.85 K/uL    Eos (Absolute) 0.00 0.00 - 0.51 K/uL    Baso (Absolute) 0.00 0.00 - 0.12 K/uL    NRBC (Absolute) 0.00 K/uL    Anisocytosis 1+     Macrocytosis 1+    COMP METABOLIC PANEL   Result Value Ref Range    Sodium 139 135 - 145 mmol/L    Potassium 3.9 3.6 - 5.5 mmol/L    Chloride 104 96 - 112 mmol/L    Co2 16 (L) 20 - 33 mmol/L    Anion Gap 19.0 (H) 7.0 - 16.0    Glucose 80 65 - 99 mg/dL    Bun 29 (H) 8 - 22 mg/dL    Creatinine 1.36 0.50 - 1.40 mg/dL    Calcium 9.3 8.5 - 10.5 mg/dL    AST(SGOT) 29 12 - 45 U/L    ALT(SGPT) 20 2 - 50 U/L    Alkaline Phosphatase 104 (H) 30 - 99 U/L    Total Bilirubin 1.5 0.1 - 1.5 mg/dL    Albumin 3.6 3.2 - 4.9 g/dL    Total Protein 8.3 (H) 6.0 - 8.2 g/dL    Globulin 4.7 (H) 1.9 - 3.5 g/dL    A-G Ratio 0.8 g/dL   TROPONIN   Result Value Ref Range    Troponin T 109 (H) 6 - 19 ng/L   COV-2, FLU A/B, AND RSV BY PCR (2-4 HOURS CEPHEID): Collect NP swab in VTM    Specimen: Respirate   Result Value Ref Range    Influenza virus A RNA Negative Negative    Influenza virus B, PCR Negative Negative    RSV, PCR Negative Negative    SARS-CoV-2 by PCR NotDetected     SARS-CoV-2 Source NP Swab    DIFFERENTIAL MANUAL   Result Value Ref Range    Manual Diff Status PERFORMED    PERIPHERAL SMEAR REVIEW   Result Value Ref Range    Peripheral Smear Review see below    PLATELET ESTIMATE   Result Value Ref Range    Plt Estimation Normal    MORPHOLOGY   Result Value Ref Range    RBC Morphology Present    ESTIMATED GFR   Result Value Ref Range    GFR If African American >60 >60 mL/min/1.73 m 2    GFR If Non African American 50 (A) >60 mL/min/1.73 m 2   Procalcitonin   Result Value Ref Range    Procalcitonin 5.47 (H) <0.25 ng/mL   proBrain Natriuretic Peptide, NT    Result Value Ref Range    NT-proBNP 79135 (H) 0 - 125 pg/mL   MAGNESIUM   Result Value Ref Range    Magnesium 1.8 1.5 - 2.5 mg/dL   Basic Metabolic Panel   Result Value Ref Range    Sodium 141 135 - 145 mmol/L    Potassium 3.8 3.6 - 5.5 mmol/L    Chloride 106 96 - 112 mmol/L    Co2 17 (L) 20 - 33 mmol/L    Glucose 80 65 - 99 mg/dL    Bun 31 (H) 8 - 22 mg/dL    Creatinine 1.22 0.50 - 1.40 mg/dL    Calcium 8.6 8.5 - 10.5 mg/dL    Anion Gap 18.0 (H) 7.0 - 16.0   TROPONIN   Result Value Ref Range    Troponin T 106 (H) 6 - 19 ng/L   ESTIMATED GFR   Result Value Ref Range    GFR If African American >60 >60 mL/min/1.73 m 2    GFR If Non  57 (A) >60 mL/min/1.73 m 2   EKG   Result Value Ref Range    Report       [facility] Emergency Dept.    Test Date:  2022  Pt Name:    NINA CAMPBELL                Department: ER  MRN:        [id number]                      Room:       [facility]  Gender:     M                            Technician: [id number]  :        [id number]                   Requested By:ER TRIAGE PROTOCOL  Order #:    [id number]                    Reading MD: RAMON BARAJAS MD    Measurements  Intervals                                Axis  Rate:       98                           P:          76  FL:         187                          QRS:        -65  QRSD:       138                          T:          6  QT:         393  QTc:        502    Interpretive Statements  Sinus tachycardia  Multiple premature complexes, vent & supraven  Probable left atrial enlargement  RBBB and LAFB  Left ventricular hypertrophy  Compared to ECG 2017 07:23:05  Left anterior fascicular block now present  Left ventricular hypertrophy now present  Sinus bradycardia no longer pr esent  First degree AV block no longer present  Electronically Signed On 2022 17:51:47 PST by RAMON BARAJAS MD         All labs reviewed by me.    EKG  Interpreted by me as shown  above.    RADIOLOGY  DX-CHEST-PORTABLE (1 VIEW)   Final Result      1.  Consolidation within the right lung base.      2.  Bilateral interstitial infiltrates.      3.  Small right pleural effusion.      4.  Cardiomegaly.      DX-FEMUR-2+ RIGHT   Final Result      No evidence of fracture or bone lesion.      DX-PELVIS-1 OR 2 VIEWS   Final Result      No evidence of fracture or dislocation.        The radiologist's interpretation of all radiological studies have been reviewed by me.    COURSE & MEDICAL DECISION MAKING  Pertinent Labs & Imaging studies reviewed. (See chart for details)    2:17 PM - Patient seen and examined at bedside. Patient will be treated with Atrovent 0.02% nebulizer 0.5 mg and albuterol 2.5 mg/0.5 ml. Ordered DX-Pelvis, DX-Femur Right, DX-Chest, Lactic Acid, COV-2, Flu A/B and RSV by PCR, Arterial Blood Gas with O2, CBC with diff, CMP, Blood Culture, Troponin, and EKG to evaluate his symptoms. The differential diagnoses include but are not limited to: pneumonia, COPD exacerbation, COVID-19, hip dislocation, hip fracture    4:15 PM - Treated patient Unasyn 3 g, Zithromax 500 mg, and LR infusion     HYDRATION: Based on the patient's presentation of Tachycardia the patient was given IV fluids. IV Hydration was used because oral hydration was not adequate alone. Upon recheck following hydration, the patient was improved.    5:00 PM - Patient was reevaluated at bedside. Discussed lab and radiology results with the patient and informed them about the plan for hospitalization at this time due to his hypoxia. Patient verbalizes understanding and agreement to this plan of care.     5:53 PM - Paged Hospitalist.     5:54 PM - Ordered ProBrain Natriuretic Peptide.     6:09 PM - I discussed the patient's case and the above findings with Dr. Vences (Hospitalist) who agrees to assess the patient for hospitalization.    Decision Making:  Presents for evaluation.  The patient was rather hypoxemic upon initial  evaluation.  He apparently has a history of COPD so I did start the patient on nebulized treatments.  Chest x-ray does show a right lower infiltrate so is concerning for pneumonia start the patient on antibiotics empirically but also there are signs of edema.  BMP was ordered to evaluate for signs of heart failure and it is elevated.  At this point I do feel the patient is a combination heart failure pneumonia.  I do feel the patient will require admission to the hospital for further treatment and care because he is hypoxemic I have spoken to the hospitalist for this admission.    DISPOSITION:  Patient will be hospitalized by  in guarded condition.       FINAL IMPRESSION  1. Pneumonia of right middle lobe due to infectious organism    2. Hypoxemia    3. DNR (do not resuscitate)          IDulce (Scribe), am scribing for, and in the presence of, Ruben Dueñas M.D..    Electronically signed by: Dulce Reese (Scribe), 1/28/2022    IRuben M.D. personally performed the services described in this documentation, as scribed by Dulce Reese in my presence, and it is both accurate and complete.    The note accurately reflects work and decisions made by me.  Ruben Dueñas M.D.  1/28/2022  10:56 PM

## 2022-01-28 NOTE — ED TRIAGE NOTES
"Chief Complaint   Patient presents with   • Chest Pain     r sided cp. pain does not radiate. began 2 days ago when he was in bed. 3/10 pain.   • Shortness of Breath     pt 80% O2 per EMS. sob began 2 weeks ago.     Pt smokes 1 pack per day.    /63   Pulse 99   Temp 36.3 °C (97.3 °F) (Temporal)   Resp (!) 22   Ht 1.778 m (5' 10\")   Wt 58.1 kg (128 lb)   SpO2 (!) 80%   BMI 18.37 kg/m²     "

## 2022-01-29 PROBLEM — J96.10 CHRONIC RESPIRATORY FAILURE (HCC): Status: ACTIVE | Noted: 2022-01-01

## 2022-01-29 PROBLEM — J96.20 ACUTE ON CHRONIC RESPIRATORY FAILURE (HCC): Status: ACTIVE | Noted: 2022-01-01

## 2022-01-29 PROBLEM — I21.4 NSTEMI (NON-ST ELEVATED MYOCARDIAL INFARCTION) (HCC): Status: ACTIVE | Noted: 2022-01-01

## 2022-01-29 PROBLEM — M25.551 RIGHT HIP PAIN: Chronic | Status: ACTIVE | Noted: 2022-01-01

## 2022-01-29 NOTE — ASSESSMENT & PLAN NOTE
Assessment & Plan  This is Sepsis Present on admission  SIRS criteria identified on my evaluation include: Tachycardia, with heart rate greater than 90 BPM, Tachypnea, with respirations greater than 20 per minute and Leukocytosis, with WBC greater than 12,000  Source is pulmonary  Sepsis protocol initiated  Pt has HFrEF with significant BLE edema.  Fluid resuscitation with caution.  IV antibiotics as appropriate for source of sepsis  While organ dysfunction may be noted elsewhere in this problem list or in the chart, degree of organ dysfunction does not meet CMS criteria for severe sepsis

## 2022-01-29 NOTE — ASSESSMENT & PLAN NOTE
*Pt complains of shortness of breath for the past two weeks.  Not on supplemental oxygen at home.  PRN albuterol at home.  On initial presentation satting at 80% on room air requiring up to 10 LPM on oxymask just to increase SpO2% above 90%.  Patient reports smoking smoking 1 ppd since age of 17.  CXR shows some evidence of emphysematous changes.  RT consult ordered to optimize respiratory meds and supplemental O2.  - continue albuterol PRN  - continue supplemental O2 titrated 88-92%

## 2022-01-29 NOTE — ED NOTES
Bedside handoff report given to Gabriela MARIO.    Pt transferred to Room T710/01 with Floor RN in stable condition.

## 2022-01-29 NOTE — PROGRESS NOTES
Baylee from Lab called with critical result of troponin 101 and blood cultures positive for gram negative rods at 0808. Critical lab result read back to Baylee.   Dr. Dey notified of critical lab result at 0809.  Critical lab result read back by Dr. Dey.

## 2022-01-29 NOTE — PROGRESS NOTES
Cardiology Follow Up Progress Note    Date of Service  1/30/22  Attending Physician  XAVIER Padilla M.D.    Chief Complaint   Right-sided chest pain x 2 days  Dyspnea x2 weeks  Right hip pain x2 days    HPI  Osmar Medina is a 81 y.o. male admitted 1/28/2022 with hypoxic respiratory failure, Covid negative, chest x-ray showing primarily right-sided infiltration consistent with pneumonia.    PMH: Remote CAD/MI/cardiac arrest ( 2013), prior suicidal ideation (2015 & 2016 ), alcohol abuse, tobacco use     Cardiology consultation for abnormal troponin.    Interim Events    10L of O2 via oxymask  Telemetry-SR with bundle, brief episodes of nonsustained V. tach  N.p.o. awaiting SLP evaluation  Labs reviewed  Leukocytosis, WBC~20    K, CR stable  LDL 40  Trop peaked at 116 flat  NT proBNP 16,000      Review of Systems  Review of Systems   Constitutional: Positive for fatigue.   Respiratory: Negative for apnea, cough, choking, chest tightness and wheezing.    Cardiovascular: Negative for chest pain and leg swelling.       Vital signs in last 24 hours  Temp:  [36.1 °C (96.9 °F)-36.3 °C (97.3 °F)] 36.1 °C (96.9 °F)  Pulse:  [] 73  Resp:  [16-48] 18  BP: ()/(44-64) 98/54  SpO2:  [80 %-100 %] 95 %    Physical Exam  Physical Exam  Cardiovascular:      Rate and Rhythm: Normal rate and regular rhythm.      Pulses: Normal pulses.      Comments: Brief episodes of nonsustained V. tach  Pulmonary:      Breath sounds: No wheezing.   Skin:     General: Skin is warm.   Neurological:      Mental Status: He is alert. Mental status is at baseline.         Lab Review  Lab Results   Component Value Date/Time    WBC 16.8 (H) 01/29/2022 02:27 AM    RBC 3.90 (L) 01/29/2022 02:27 AM    HEMOGLOBIN 12.7 (L) 01/29/2022 02:27 AM    HEMATOCRIT 38.5 (L) 01/29/2022 02:27 AM    MCV 98.7 (H) 01/29/2022 02:27 AM    MCH 32.6 01/29/2022 02:27 AM    MCHC 33.0 (L) 01/29/2022 02:27 AM    MPV 10.8 01/29/2022 02:27 AM      Lab Results    Component Value Date/Time    SODIUM 141 01/29/2022 02:27 AM    POTASSIUM 3.8 01/29/2022 02:27 AM    CHLORIDE 109 01/29/2022 02:27 AM    CO2 17 (L) 01/29/2022 02:27 AM    GLUCOSE 94 01/29/2022 02:27 AM    BUN 36 (H) 01/29/2022 02:27 AM    CREATININE 1.22 01/29/2022 02:27 AM      Lab Results   Component Value Date/Time    ASTSGOT 29 01/28/2022 03:31 PM    ALTSGPT 20 01/28/2022 03:31 PM     Lab Results   Component Value Date/Time    CHOLSTRLTOT 147 01/15/2015 02:36 AM    LDL 57 01/15/2015 02:36 AM    HDL 73 01/15/2015 02:36 AM    TRIGLYCERIDE 87 01/15/2015 02:36 AM    TROPONINT 101 (H) 01/29/2022 07:10 AM       Recent Labs     01/28/22  1802   NTPROBNP 01386*       Cardiac Imaging and Procedures Review  EKG:  , PVCs    Echocardiogram:  1/29/22  The left ventricular ejection fraction is visually estimated to be 25-  30%.,  Moderate to severely reduced.  Hypokinesis to akinesis of the basal to mid inferolateral/inferior,   basal inferior septum, basal lateral walls.  Some walls appear thin and scarred.  Reduced right ventricular systolic function.  Mild to moderate aortic insufficiency.  Normal estimated RAP, unable to estimate RVSP.  Findings consistent with ischemic cardiomyopathy.    Cardiac Catheterization:  n/a    Imaging  Chest X-Ray:   Consolidation within the right lung base.     2.  Bilateral interstitial infiltrates.     3.  Small right pleural effusion.     4.  Cardiomegaly.    Stress Test:  n/a    Assessment/Plan    Hypoxic respiratory failure  Sepsis pneumonia  -Currently n.p.o., SLP eval pending  -On cefepime, doxycycline      Abnormal troponin  Trop 109 flat  -Likely type II demand ischemia in the setting of sepsis pneumonia and underlying CAD.  -Echo however shows new worsening cardiomyopathy LVEF 25 to 30% with hypokinesis to akinesis of the basal to mid inferolateral l/inferobasal inferior septum.  -Continue with GDMT  -Continue with aspirin 81, carvedilol 6.25 twice daily  -Depending on patient's  clinical course will evaluate for ischemic work-up prior to discharge.   -Currently chest pain-free  -Remains n.p.o., on 10 L of oxygen  -LDL 40      History of CAD/MI (severely calcified RCA not amenable to intervention 2013 )  -Continue with aspirin, Coreg    Addendum: 1/30/2022 at 1130 blood cultures positive for Haemophilus influenza, no plan for ischemic work-up inpatient, I will schedule a follow-up appointment with our interventionist in 2 weeks to be evaluated for ischemic work-up.    Cardiology will sign off    Thank you for allowing me to participate in the care of this patient.    Please contact me with any questions.    ALON Mulligan.   Cardiologist, [facility]  [phone]

## 2022-01-29 NOTE — PROGRESS NOTES
Notified by monitor tech of pt having 8 beats V-Tach. Pt asymptomatic on assessment. Resident Dr. Dey notified. Will continue to monitor.

## 2022-01-29 NOTE — CONSULTS
Cardiology Consult Note    DOS: 1/29/2022    Consulting physician: Thierno Padilla MD    Chief complaint/Reason for consult: Abnormal trop    HPI:  Pt is an 82 yo M. He has a history of remote CAD and prior MI, prior suicidal ideation, alcohol abuse, and borderline depressed LV function. He presents with several weeks of shortness of breath and R hip pain. He denies any chest pain to me today on interview. On arrival admitted with hypoxic respiratory failure. COVID negative. CXR showing primarily R sided infiltrates consistent with PNA. He tells me his breathing is slightly easier since his arrival. Cardiac enzymes were mildly elevated and flat. Cardiology asked to comment on abnormal trop.    ROS (+in BOLD):  Constitutional: Fevers/chills/fatigue/weightloss  HEENT: Blurry vision/eye pain/sore throat/hearing loss  Respiratory: Shortness of breath/cough  Cardiovascular: Chest pain/palpitations/edema/orthopnea/syncope  GI: Nausea/vomitting/diarrhea  MSK: Arthralgias/myagias/muscle weakness  Skin: Rash/sores  Neurological: Numbness/tremors/vertigo  Endocrine: Excessive thirst/polyuria/cold intolerance/heat intolerance  Psych: Depression/anxiety    Past Medical History:   Diagnosis Date   • ACS (acute coronary syndrome) ([facility]) 2/27/2013   • Acute inferior myocardial infarction (HCC) 3/20/2013   • Anemia 3/19/2013   • CAD (coronary artery disease) 3/11/2013   • Cardiac arrest ([facility]) 3/11/2013   • ETOH abuse    • Hyperlipidemia 3/11/2013   • Hypertension    • Ischemic cardiomyopathy 3/11/2013   • Smoker        Past Surgical History:   Procedure Laterality Date   • GASTROSCOPY-ENDO  3/22/2013    Performed by Edson Mccoy M.D. at ENDOSCOPY [facility]   • COLONOSCOPY - ENDO  3/22/2013    Performed by Edson Mccoy M.D. at ENDOSCOPY [facility]   • ANGIOPLASTY     • RI APPENDECTOMY     • ZZZ CARDIAC CATH         Social History     Socioeconomic History   • Marital status:      Spouse name: Not on file    • Number of children: Not on file   • Years of education: Not on file   • Highest education level: Not on file   Occupational History   • Not on file   Tobacco Use   • Smoking status: Current Every Day Smoker     Packs/day: 1.00     Types: Cigarettes   • Smokeless tobacco: Never Used   Vaping Use   • Vaping Use: Never used   Substance and Sexual Activity   • Alcohol use: Not Currently   • Drug use: No   • Sexual activity: Not on file   Other Topics Concern   • Not on file   Social History Narrative   • Not on file     Social Determinants of Health     Financial Resource Strain:    • Difficulty of Paying Living Expenses: Not on file   Food Insecurity:    • Worried About Running Out of Food in the Last Year: Not on file   • Ran Out of Food in the Last Year: Not on file   Transportation Needs:    • Lack of Transportation (Medical): Not on file   • Lack of Transportation (Non-Medical): Not on file   Physical Activity:    • Days of Exercise per Week: Not on file   • Minutes of Exercise per Session: Not on file   Stress:    • Feeling of Stress : Not on file   Social Connections:    • Frequency of Communication with Friends and Family: Not on file   • Frequency of Social Gatherings with Friends and Family: Not on file   • Attends [employer] Services: Not on file   • Active Member of Clubs or Organizations: Not on file   • Attends Club or Organization Meetings: Not on file   • Marital Status: Not on file   Intimate Partner Violence:    • Fear of Current or Ex-Partner: Not on file   • Emotionally Abused: Not on file   • Physically Abused: Not on file   • Sexually Abused: Not on file   Housing Stability:    • Unable to Pay for Housing in the Last Year: Not on file   • Number of Places Lived in the Last Year: Not on file   • Unstable Housing in the Last Year: Not on file       Family History   Problem Relation Age of Onset   • Heart Attack Father        Allergies   Allergen Reactions   • Heparin Unspecified      Heparin[facility]ocytopenia [facility]       Current Facility-Administered Medications   Medication Dose Route Frequency Provider Last Rate Last Admin   • doxycycline (VIBRAMYCIN) 100 mg in  mL IVPB  100 mg Intravenous Q12HRS Silvestre Dey M.D.   Stopped at 01/29/22 0935   • thiamine (B-1) IVPB 100 mg in 100 mL D5W (premix)  100 mg Intravenous DAILY XAVIER Padilla M.D.   Stopped at 01/29/22 1036   • dextrose 5 % and 0.45 % NaCl with KCl 20 mEq   Intravenous Continuous XAVIER Padilla M.D.       • cefTRIAXone (Rocephin) syringe 2 g  2 g Intravenous Q24HRS XAVIER Padilla M.D.       • aspirin (ASA) chewable tab 243 mg  243 mg Oral Once Silvestre Dey M.D.       • albuterol inhaler 2 Puff  2 Puff Inhalation Q6HRS PRN Francisco Vences M.D.       • aspirin EC (ECOTRIN) tablet 81 mg  81 mg Oral QAM Francisco Vences M.D.   81 mg at 01/29/22 0550   • busPIRone (BUSPAR) tablet 5 mg  5 mg Oral BID Francisco Vences M.D.   5 mg at 01/29/22 0548   • carvedilol (COREG) tablet 6.25 mg  6.25 mg Oral BID WITH MEALS Francisco Vences M.D.       • isosorbide mononitrate SR (IMDUR) tablet 30 mg  30 mg Oral QAM Francisco Vences M.D.   30 mg at 01/29/22 0549   • lisinopril (PRINIVIL) tablet 20 mg  20 mg Oral BID Francisco Vences M.D.   20 mg at 01/29/22 0547   • simvastatin (ZOCOR) tablet 20 mg  20 mg Oral Nightly Francisco Vences M.D.   20 mg at 01/28/22 2201   • senna-docusate (PERICOLACE or SENOKOT S) 8.6-50 MG per tablet 2 Tablet  2 Tablet Oral BID Francisco Vences M.D.        And   • polyethylene glycol/lytes (MIRALAX) PACKET 1 Packet  1 Packet Oral QDAY PRN Francisco Vences M.D.        And   • magnesium hydroxide (MILK OF MAGNESIA) suspension 30 mL  30 mL Oral QDAY PRN Francisco Vences M.D.        And   • bisacodyl (DULCOLAX) suppository 10 mg  10 mg Rectal QDAY PRN Francisco Vences M.D.       • Respiratory Therapy Consult   Nebulization Continuous RT Francisco Vences M.D.       • acetaminophen (Tylenol) tablet 650  mg  650 mg Oral Q6HRS PRN Francisco Vences M.D.   650 mg at 01/28/22 2159   • labetalol (NORMODYNE/TRANDATE) injection 10 mg  10 mg Intravenous Q4HRS PRN Francisco Vences M.D.           Physical Exam:  Vitals:    01/29/22 0503 01/29/22 0547 01/29/22 0549 01/29/22 0800   BP: (!) 97/60 114/50 114/50 (!) 98/54   Pulse:    73   Resp:    18   Temp:    36.1 °C (96.9 °F)   TempSrc:    Temporal   SpO2:       Weight:       Height:         General appearance: Mild respiratory distress  Eyes: anicteric sclerae, moist conjunctivae;  PERRLA  HENT: Atraumatic; oropharynx clear with moist mucous membranes   Neck: Trachea midline; FROM, supple, no thyromegaly or lymphadenopathy  Lungs: Decreased BS bilaterally, R > L  CV: RRR, no MRGs, no JVD   Abdomen: Soft, non-tender; no masses or HSM  Extremities: No peripheral edema or extremity lymphadenopathy  Skin: Normal temperature, turgor and texture  Psych: AOX3    Data:  Labs reviewed:  Trop hovering around 100, BNP is markedly elevated, ALFIE, mild lactic acidosis    Prior echo/stress results reviewed:  Prior stress showing inferior scar on nuc scan  LVEF 45-50%    CXR interpreted by me:  Bilateral infiltrates R > L    EKG interpreted by me:   Sinus, PVCs    Impression/Plan:  1) Sepsis/PNA  2) Hypoxic respiratory failure  3) Abnormal trop  3) History of CAD and prior cardiomyopathy    -Clinical picture not consistent with ACS, I lean towards abnormal trop in setting of underlying coronary disease and hypoxic insult in setting of sepsis and PNA  -I would treat him for such  -Can repeat nuc stress and echo and if markedly different than before with new ischemia or worsening LV function can consider repeat ischemic eval once he is recovered from PNA    Danisha Mejia MD

## 2022-01-29 NOTE — DIETARY
"Nutrition services: Day 1 of admit.  Osmar Medina is a 81 y.o. male with admitting DX of acute respiratory failure  Consult received for BMI <19, MST 2    Attempted to meet with pt at bedside but he was busy with staff and unavailable.     Assessment:  Height: 177.8 cm (5' 10\")  Weight: 58.1 kg (128 lb)- stated weight. Asked RN and CNA for updated weight.   Body mass index is 18.37 kg/m²., BMI classification: underweight  Diet/Intake: NPO x1 day.     Evaluation:   1. Pt admitted for acute respiratory failure with NSTEMI, troponin above reference range, metabolic acidosis, sepsis, HIT, pneumonia  2. Speech evaluation today. Per SLP note-recommended NPO d/t respiratory status, weakness and fatigue with pending re-evaluation tomorrow   3. Limited information in ADL's to review PO intake  4. Labs: Bun 36, GFR 57  5. Meds: senna-docusate, thiamine, bowel regimen, dextrose 5% and 0.45% NaCl w/ KCl  6. Skin: no staged pressure injuries noted  7. GI: last BM PTA    Malnutrition Risk: Unable to fully assess at this time.     Recommendations/Plan:  1. Advance diet per MD/SLP  2. Document intake of all meals as % taken in ADL's to provide interdisciplinary communication across all shifts.   3. Monitor weight.    RD following.           "

## 2022-01-29 NOTE — THERAPY
"Speech Language Pathology   Initial Assessment     Patient Name: Osmar Medina  AGE:  81 y.o., SEX:  male  Medical Record #: [id number]  Today's Date: 1/29/2022     Precautions  Precautions: Swallow Precautions ( See Comments)    Assessment  Patient is 81 y.o. male admitted with chest pain, SOB. PMHx: remote CAD, prior MI, prior suicidal ideation, alcohol abuse, borderline depressed LV function. Cxr reports: consolidation within right lung base, bilateral interstitial infiltrates. Chart review indicates distant hx of dysphagia in 2015.    Clinical swallow evaluation completed this date. Pt endorses hx of dysphagia, states that he needs to \"always think about what I'm doing\" while eating a meal. Oral mechanism exam completed, noted thick, white secretions in oral cavity that were cleared with oral swab. Pt reports he was unaware of secretions sitting in oral cavity. He wears upper and lower dentures that appear ill-fitting. Orofacial musculature appearing intact, voice with decreased intensity. Single ice chips x 3 resulted in complete hyolaryngeal elevation, delayed but functional initiation of swallow trigger, and a very weak cough x 1. 1/2 tsp sips of MTL x 4 resulted in no overt s/sx concerning for aspiration, however noted subtle increase in WOB, pt appearing to fatigue very quickly. Given respiratory status, weakness, and quick fatigue, did not trial further PO. At this time, recommend NPO pending re-evaluation Sunday. Pt verbalized good understanding. Please ensure good oral care.     Plan  1. NPO pending re-evaluation     Recommend Speech Therapy 3 times per week until therapy goals are met for the following treatments:  Dysphagia Training.    Discharge Recommendations: Recommend post-acute placement for additional speech therapy services prior to discharge home       Objective     01/29/22 1435   Precautions   Precautions Swallow Precautions ( See Comments)   Pain 0 - 10 Group   Therapist Pain Assessment Post " Activity Pain Same as Prior to Activity;Nurse Notified;0   Prior Level Of Function   Swallow Impaired  (Distant hx of dysphagia - 2015)   Laryngeal Function   Voice Quality Within Functional Limits   Volutional Cough Moderate   Excursion Upon Swallow Complete   Oral Food Presentation   Ice Chips Minimal   Single Swallow Mildly Thick (2) - (Nectar Thick)  Minimal   Self Feeding Needs Assistance   Dysphagia Strategies / Recommendations   Strategies / Interventions Recommended (Yes / No) Yes   Compensatory Strategies To Be Assessed   Diet / Liquid Recommendation NPO;Pre-Feeding Trials with SLP Only   Medication Administration  Other (See Comments)  (Non-orally)   Follow Up SLP Evaluation SLP to follow   Short Term Goals   Short Term Goal # 1 Pt will consume pre-feeding trials with SLP and no overt s/sx concerning for aspiration.

## 2022-01-29 NOTE — PROGRESS NOTES
Notified by monitor tech of pt having 7 beats V-Tach. Pt asymptomatic. Dr. Dey aware, at bedside at time of event. MD to review labs at this time. Will continue to monitor.

## 2022-01-29 NOTE — ASSESSMENT & PLAN NOTE
*on 2 LPM O2 for Sat 88-92  - Pt refuses pIV access and midline placement.  D/c unasyn  - Levoquin daily until 2/8/22  - pt refuses blood draws. Unable to repeat CBCs  - continue albuterol PRN  - continue supplemental O2 titrated 88-92%

## 2022-01-29 NOTE — PROGRESS NOTES
Report received from night shift RN, assumed care of pt. Pt A&Ox4, pt has slurred speech. Plan of care discussed with pt, labs and chart reviewed. All needs met at this time. Tele box on. Pt on 10L oxymask. Call light within reach, bed locked and in lowest position. All fall precautions and hourly rounding in place. Will continue to monitor.

## 2022-01-29 NOTE — ED NOTES
Handoff report received from JASSON RN.    Pt resting quietly in [facility]. Noted coughing; however saturation is at 95% via NC at 2L. No apparent distress noted at this time. VS stable and will continue to monitor pt.

## 2022-01-29 NOTE — H&P
Hospital Medicine History & Physical Note    Date of Service  1/28/2022    Primary Care Physician  Jonnathan Hilario M.D.      Code Status  DNAR/DNI    Chief Complaint  Chief Complaint   Patient presents with   • Chest Pain     r sided cp. pain does not radiate. began 2 days ago when he was in bed. 3/10 pain.   • Shortness of Breath     pt 80% O2 per EMS. sob began 2 weeks ago.       History of Presenting Illness  Osmar Medina is a 81 y.o. male who presented 1/28/2022 with presents to the Emergency Department with right-sided chest pain onset 2 days ago. He is additionally experiencing shortness of breath onset 2 weeks ago and right hip pain onset 2 days ago. He reports the pain radiates down his thigh when he moves his hip. His pain is exacerbated with movement of the hip. He denies any recent falls or trauma to the area. He reports using an albuterol inhaler at home but denies using oxygen at home. He denies chest pain, fever, chills, abdominal pain, nausea, and vomiting. He reports he is fully vaccinated against COVID-19 x2.   At bedside he is acute and chronically ill-appearing in moderate respiratory distress with retractions, tachypnea and nonproductive cough.  He denies chest pain or recent antibiotic use     He confirms a DNR CODE STATUS    I discussed the plan of care with patient.    Review of Systems  Review of Systems   Constitutional: Positive for fever and malaise/fatigue. Negative for weight loss.   HENT: Negative for sore throat and tinnitus.    Eyes: Negative for blurred vision and double vision.   Respiratory: Positive for cough and shortness of breath. Negative for hemoptysis and stridor.    Cardiovascular: Negative for chest pain and palpitations.   Gastrointestinal: Negative for nausea and vomiting.   Genitourinary: Negative for dysuria and urgency.   Musculoskeletal: Negative for myalgias and neck pain.   Skin: Negative for itching and rash.   Neurological: Negative for dizziness and headaches.    Endo/Heme/Allergies: Does not bruise/bleed easily.   Psychiatric/Behavioral: Negative for depression. The patient does not have insomnia.        Past Medical History   has a past medical history of ACS (acute coronary syndrome) (HCC) (2/27/2013), Acute inferior myocardial infarction (HCC) (3/20/2013), Anemia (3/19/2013), CAD (coronary artery disease) (3/11/2013), Cardiac arrest (HCC) (3/11/2013), ETOH abuse, Hyperlipidemia (3/11/2013), Hypertension, Ischemic cardiomyopathy (3/11/2013), and Smoker.    Surgical History   has a past surgical history that includes pr appendectomy; gastroscopy-endo (3/22/2013); colonoscopy - endo (3/22/2013); zzz cardiac cath; and angioplasty.     Family History  family history includes Heart Attack in his father.   Family history reviewed with patient. There is no family history that is pertinent to the chief complaint.     Social History   reports that he has been smoking cigarettes. He has been smoking about 1.00 pack per day. He has never used smokeless tobacco. He reports previous alcohol use. He reports that he does not use drugs.    Allergies  Allergies   Allergen Reactions   • Heparin Unspecified     Heparin-[facility] thrombocytopenia ([facility]       Medications  Prior to Admission Medications   Prescriptions Last Dose Informant Patient Reported? Taking?   albuterol (VENTOLIN OR PROVENTIL) 108 (90 BASE) MCG/ACT Aero Soln inhalation aerosol  Patient's Home Pharmacy Yes No   Sig: Inhale 2 Puffs by mouth every 6 hours as needed for Shortness of Breath.   aspirin EC (ECOTRIN) 81 MG Tablet Delayed Response  Patient's Home Pharmacy Yes No   Sig: Take 81 mg by mouth every morning.   carvedilol (COREG) 6.25 MG Tab 1/28/2022 at am Patient Yes No   Sig: Take 6.25 mg by mouth 2 times a day, with meals.   cyanocobalamin (VITAMIN B-12) 500 MCG Tab  Patient's Home Pharmacy Yes Yes   Sig: Take 500 mcg by mouth every day.   lisinopril (PRINIVIL) 20 MG Tab 1/28/2022 at am Patient Yes No   Sig:  Take 20 mg by mouth 2 times a day.   omeprazole (PRILOSEC) 40 MG delayed-release capsule  Patient's Home Pharmacy Yes No   Sig: Take 40 mg by mouth every morning.   oxybutynin (DITROPAN) 5 MG Tab  Patient's Home Pharmacy Yes Yes   Sig: Take 5 mg by mouth every evening.   sertraline (ZOLOFT) 50 MG Tab  Patient's Home Pharmacy Yes Yes   Sig: Take 50 mg by mouth every evening.   therapeutic multivitamin-minerals (THERAGRAN-M) Tab  Patient's Home Pharmacy Yes No   Sig: Take 1 Tab by mouth every day.   vitamin D3 (CHOLECALCIFEROL) 1000 Unit (25 mcg) Tab  Patient's Home Pharmacy Yes Yes   Sig: Take 1,000 Units by mouth every day.      Facility-Administered Medications: None       Physical Exam  Temp:  [36.3 °C (97.3 °F)] 36.3 °C (97.3 °F)  Pulse:  [89-99] 94  Resp:  [22-48] 30  BP: (127-132)/(59-63) 127/59  SpO2:  [80 %-100 %] 98 %  Blood Pressure : 127/59   Temperature: 36.3 °C (97.3 °F)   Pulse: 94   Respiration: (!) 30   Pulse Oximetry: 98 %       Physical Exam  Vitals and nursing note reviewed.   Constitutional:       General: He is in acute distress.      Appearance: He is normal weight. He is ill-appearing and toxic-appearing.   HENT:      Head: Normocephalic and atraumatic.      Nose: Nose normal. No congestion or rhinorrhea.      Mouth/Throat:      Mouth: Mucous membranes are dry.      Pharynx: Oropharynx is clear.   Eyes:      Extraocular Movements: Extraocular movements intact.      Conjunctiva/sclera: Conjunctivae normal.      Pupils: Pupils are equal, round, and reactive to light.   Neck:      Vascular: No carotid bruit.   Cardiovascular:      Rate and Rhythm: Normal rate and regular rhythm.      Pulses: Normal pulses.      Heart sounds: Normal heart sounds. No murmur heard.  No gallop.    Pulmonary:      Effort: Respiratory distress present.      Breath sounds: Rhonchi and rales present. No wheezing.   Abdominal:      General: Abdomen is flat. Bowel sounds are normal. There is no distension.      Palpations:  Abdomen is soft. There is no mass.      Tenderness: There is no abdominal tenderness. There is no guarding or rebound.      Hernia: No hernia is present.   Musculoskeletal:         General: No swelling, tenderness, deformity or signs of injury.      Cervical back: Normal range of motion and neck supple. No muscular tenderness.   Lymphadenopathy:      Cervical: No cervical adenopathy.   Skin:     Capillary Refill: Capillary refill takes less than 2 seconds.      Coloration: Skin is pale. Skin is not jaundiced.      Findings: No bruising.   Neurological:      General: No focal deficit present.      Mental Status: He is alert and oriented to person, place, and time. Mental status is at baseline.      Cranial Nerves: No cranial nerve deficit.      Motor: No weakness.      Coordination: Coordination normal.   Psychiatric:         Mood and Affect: Mood normal.         Thought Content: Thought content normal.         Judgment: Judgment normal.         Laboratory:  Recent Labs     01/28/22  1531   WBC 14.6*   RBC 4.18*   HEMOGLOBIN 13.5*   HEMATOCRIT 40.2*   MCV 96.2   MCH 32.3   MCHC 33.6*   RDW 50.1*   PLATELETCT 186   MPV 11.0     Recent Labs     01/28/22  1531   SODIUM 139   POTASSIUM 3.9   CHLORIDE 104   CO2 16*   GLUCOSE 80   BUN 29*   CREATININE 1.36   CALCIUM 9.3     Recent Labs     01/28/22  1531   ALTSGPT 20   ASTSGOT 29   ALKPHOSPHAT 104*   TBILIRUBIN 1.5   GLUCOSE 80         No results for input(s): NTPROBNP in the last 72 hours.      Recent Labs     01/28/22  1531   TROPONINT 109*       Imaging:  DX-CHEST-PORTABLE (1 VIEW)   Final Result      1.  Consolidation within the right lung base.      2.  Bilateral interstitial infiltrates.      3.  Small right pleural effusion.      4.  Cardiomegaly.      DX-FEMUR-2+ RIGHT   Final Result      No evidence of fracture or bone lesion.      DX-PELVIS-1 OR 2 VIEWS   Final Result      No evidence of fracture or dislocation.          X-Ray:  I have personally reviewed the  images and compared with prior images.    Assessment/Plan:  I anticipate this patient will require at least two midnights for appropriate medical management, necessitating inpatient admission.    * Acute respiratory failure (HCC)- (present on admission)  Assessment & Plan  Acute and chronic respiratory failure, secondary to pneumonia, aggressive pulmonary toilet, supplemental oxygen.    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)  Assessment & Plan  Documented history, avoid heparin    Sepsis (HCC)  Assessment & Plan  This is Sepsis Present on admission  SIRS criteria identified on my evaluation include: Tachypnea, with respirations greater than 20 per minute  Source is pneumonia  Sepsis protocol initiated  Fluid resuscitation ordered per protocol  IV antibiotics as appropriate for source of sepsis  While organ dysfunction may be noted elsewhere in this problem list or in the chart, degree of organ dysfunction does not meet CMS criteria for severe sepsis          Metabolic acidosis  Assessment & Plan  Likely secondary to sepsis, follow-up follow-up chemistry    Troponin I above reference range  Assessment & Plan  Denies chest pain, nonspecific changes of EKG, likely secondary to acute respiratory failure, follow-up serial troponin    Pneumonia- (present on admission)  Assessment & Plan  Pneumonia care set, aggressive pulmonary toilet, follow-up CBC and blood culture        VTE prophylaxis: SCDs/TEDs

## 2022-01-29 NOTE — PROGRESS NOTES
Received report from Dulce MARIO.  This RN assumed care and patient arrived on floor at 2050. This pt is AOx4, 3/10 pain. Patient and RN discussed plan of care: questions answered. Labs noted, assessment complete, patient tolerating cardiac diet with a 1200 mL fluid restriction. Tele box in place. Pt is on 3 L of O2 via nasal cannula. Call light in place, fall precautions in place, patient educated on importance of calling for assistance. No additional needs at this time. VSS

## 2022-01-29 NOTE — PROGRESS NOTES
Attempted to wean pt's oxygen from 10L to 8L oxymask. Pt sats dropped from 97% to 87% rapidly. O2 set back to 10L, pt sats back to 95%. Will continue to monitor.

## 2022-01-29 NOTE — ASSESSMENT & PLAN NOTE
*Patient presented with SOB breath and in ED noted to have SpO2 80% on room air.    *on 2 LPM O2 for Sat 88-92  - Pt refuses pIV access and midline placement.  D/c unasyn  - Levoquin daily until 2/8/22  - pt refuses blood draws. Unable to repeat CBCs  - continue albuterol PRN  - continue supplemental O2 titrated 88-92%

## 2022-01-29 NOTE — CARE PLAN
Problem: Nutritional:  Goal: Achieve adequate nutritional intake  Description: Advance diet per MD/SLP. Patient will consume >50% of meals  Outcome: Not Met

## 2022-01-29 NOTE — ASSESSMENT & PLAN NOTE
Denies chest pain, nonspecific changes of EKG, likely secondary to acute respiratory failure, follow-up serial troponin

## 2022-01-29 NOTE — PROGRESS NOTES
4 Eyes Skin Assessment Completed by FABIANO Ochoa and FABIANO Huff.    Head WDL  Ears Scabs/dryness behing bilat ears  Nose WDL  Mouth WDL  Neck WDL  Breast/Chest WDL  Shoulder Blades WDL  Spine WDL  (R) Arm/Elbow/Hand Bruising, fragile, redness  (L) Arm/Elbow/Hand Bruising, fragile, redness  Abdomen Scar to lower abdomen  Groin WDL  Scrotum/Coccyx/Buttocks Redness and Blanching  (R) Leg WDL  (L) Leg WDL  (R) Heel/Foot/Toe Boggy, calloused, dry, flaky.  Red circular rash on top of R foot  (L) Heel/Foot/Toe Boggy, calloused, dry, flaky            Devices In Places Tele Box, Blood Pressure Cuff and Pulse Ox, nasal cannula      Interventions In Place Heel Mepilex, Sacral Mepilex, Pillows and Pressure Redistribution Mattress.  Patient refusing Q2 turns. Hip bony prominences covered with mepilexes    Possible Skin Injury No    Pictures Uploaded Into Epic N/A  Wound Consult Placed N/A  RN Wound Prevention Protocol Ordered Yes

## 2022-01-30 NOTE — THERAPY
"Speech Language Pathology  Daily Treatment     Patient Name: Osmar Medina  Age:  81 y.o., Sex:  male  Medical Record #: [id number]  Today's Date: 1/30/2022     Precautions  Precautions: Swallow Precautions ( See Comments)    Assessment  Patient seen this date for swallowing re-assessment. Patient in bed on 10L O2 via oxymask. Asking for water. Coughing up thick secretions. Oral care provided. Loose-fitting dentures observed. Patient stating that he tends to eat soft solids at home ([employer] meals) and coughs on the cheese. Added \"I know better than to take big gulps that make me cough. I take really small sips from water bottles.\"   1:1 feeding assistance provided for the following: ice chips, mildly thick liquids via tsp and cup, thin liquids via tsp, liquidized textures, puree textures. Patient demonstrated productive cough with/without PO intake. Use of suction mildly helpful in clearing. No overt s/s of aspiration observed with ice chips or multiple tsp sips of mildly thick liquid. Vocal quality improving with these trials. Delayed wet cough observed after 3 cup sips of mildly thick liquids as well as following 3 tsp sips of thin water, though less significant following tsp sips of thin water compared to mildly thick liquid. No cough or throat clear observed with liquidized or puree textures.    At this time, recommend initiate conservative diet of mildly thick liquids with puree textures. 1:1 feeding assistance. Single sips by spoon. Medication crushed in puree. D/C if coughing.     Recommend instrumental assessment for further assessment of swallow function. Requesting MBSS for visualization of mastication with loose-fitting dentition as well as assessment of pharyngeal residue/clearance post-swallow d/t delayed coughing.     Plan  1) Mildly thick liquids/puree textures, 1:1 feeding assistance.   2) Crushed medication in puree  3) D/C if coughing  4) MBSS ordered    Continue current treatment " plan.    Discharge Recommendations: Recommend post-acute placement for additional speech therapy services prior to discharge home    Subjective  Patient seen this date for re-assessment of swallow function.     Objective       01/30/22 1312   Charge Group   SLP Swallowing Dysfunction Treatment Swallowing Dysfunction Treatment   Dysphagia    Dysphagia X   Positioning / Behavior Modification Cough / Clear after Swallow;Effortful Swallow;Modulate Rate or Bite Size;Alternate Solids and Liquids;Other (see Comments)  (1:1 assistance)   Other Treatments Oral care, PO trials of ice chips, mildly thick liqudis, thin liquids, liquidized textures, puree textures   Diet / Liquid Recommendation Mildly Thick (2) - (Nectar Thick);Puree (4)   Nutritional Liquid Intake Rating Scale Thickened beverages (mildly thick unless otherwise specified)   Nutritional Food Intake Rating Scale Total oral diet of a single consistency   Nursing Communication Swallow Precaution Sign Posted at Head of Bed   Skilled Intervention Compensatory Strategies   Recommended Route of Medication Administration   Medication Administration  Crush all Medications in Puree   Short Term Goals   Short Term Goal # 1 Pt will consume pre-feeding trials with SLP and no overt s/sx concerning for aspiration.   Goal Outcome # 1 Progressing as expected   Short Term Goal # 2 Pt will consume diet of mildly thick liquids with puree textures without overt s/s of aspiration    Education Group   Education Provided Dysphagia;Medications / Pain Control;Role of Speech Therapy   Dysphagia Patient Response Patient;Acceptance;Demonstration;Action Demonstration;Reinforcement Needed   Meds / Pain Control Patient Response Patient;Acceptance;Demonstration;Reinforcement Needed;Action Demonstration   Role of SLP Patient Response Patient;Acceptance;Demonstration;Action Demonstration;Reinforcement Needed   Anticipated Discharge Needs   Discharge Recommendations Recommend post-acute placement  for additional speech therapy services prior to discharge home   Therapy Recommendations Upon DC Dysphagia Training;Patient / Family / Caregiver Education   Interdisciplinary Plan of Care Collaboration   IDT Collaboration with  Nursing;Physician   Patient Position at End of Therapy In Bed;Call Light within Reach;Tray Table within Reach;Phone within Reach;Other (Comments)  (RN present)   Collaboration Comments RN/MD updated with results and recs

## 2022-01-30 NOTE — PROGRESS NOTES
Criselda from Lab called to notify this RN that pt's blood cx positive fore heamophilius influenza. Dr. Dey notified of positive blood cx results.

## 2022-01-30 NOTE — RESPIRATORY CARE
"COPD EDUCATION by COPD CLINICAL EDUCATOR  1/29/2022 at 4:14 PM by Kathy Reyes RRT     Patient interviewed by COPD education team. Patient refused COPD program at this time. A comprehensive packet including information about COPD, treatments, and smoking cessation given. The COPD Action Plan updated in patients chart.                   COPD Screen  COPD Risk Screening  Do you have a history of COPD?: Yes  Do you have a Pulmonologist?: No  COPD Population Screener  During the past 4 weeks, how much did you feel short of breath?: Some of the time  Do you ever cough up any mucus or phlegm?: Yes, a few days a week or month  In the past 12 months, you do less than you used to because of your breathing problems: Disagree/unsure  Have you smoked at least 100 cigarettes in your entire life?: Yes  How old are you?: 60+  COPD Screening Score: 6  COPD Coordinator Not Recommended: Yes    COPD Assessment  COPD Clinical Specialists ONLY  COPD Education Initiated: Yes--Short Intervention (Pt refussed discussion, COPD and Smoking Cessation literature left at the bedside.)  DME Company: none  DME Equipment Type: none  Physician Name: Jonnathan Hilario M.D.  Pulmonologist Name: given list of local pulmonary clinics  Referrals Initiated:  (Pt refussed)  Is this a COPD exacerbation patient?: No  (OP) Pulmonary Function Testing:  (none on file)    PFT Results    No results found for: PFT    Meds to Beds  Would the patient like to opt in for Bedside Medication Delivery at Discharge?: Unable to ask     MY COPD ACTION PLAN     It is recommended that patients and physicians /healthcare providers complete this action plan together. This plan should be discussed at each physician visit and updated as needed.    The green, yellow and red zones show groups of symptoms of COPD. This list of symptoms is not comprehensive, and you may experience other symptoms. In the \"Actions\" column, your healthcare provider has recommended actions for you to take " "based on your symptoms.    Patient Name: Osmar Medina   YOB: 1940   Last Updated on:     Green Zone:  I am doing well today Actions   •  Usual activitiy and exercise level •  Take daily medications   •  Usual amounts of cough and phlegm/mucus •  Use oxygen as prescribed   •  Sleep well at night •  Continue regular exercise/diet plan   •  Appetite is good •  At all times avoid cigarette smoke, inhaled irritants     Daily Medications (these medications are taken every day):                Yellow Zone:  I am having a bad day or a COPD flare Actions   •  More breathless than usual •  Continue daily medications   •  I have less energy for my daily activities •  Use quick relief inhaler as ordered   •  Increased or thicker phlegm/mucus •  Use oxygen as prescribed   •  Using quick relief inhaler/nebulizer more often •  Get plenty of rest   •  Swelling of ankles more than usual •  Use pursed lip breathing   •  More coughing than usual •  At all times avoid cigarette smoke, inhaled irritants   •  I feel like I have a \"chest cold\"   •  Poor sleep and my symptoms woke me up   •  My appetite is not good   •  My medicine is not helping    •  Call provider immediately if symptoms don’t improve     Continue daily medications, add rescue medications:   Albuterol 2 Puffs Every 6 hours PRN       Medications to be used during a flare up, (as Discussed with Provider):           Additional Information:  Use a spacer with your rescue inhaler.    Red Zone:  I need urgent medical care Actions   •  Severe shortness of breath even at rest •  Call 911 or seek medical care immediately   •  Not able to do any activity because of breathing    •  Fever or shaking chills    •  Feeling confused or very drowsy     •  Chest pains    •  Coughing up blood              "

## 2022-01-30 NOTE — ASSESSMENT & PLAN NOTE
This is Sepsis Present on admission  SIRS criteria identified on my evaluation include: Tachycardia, with heart rate greater than 90 BPM, Tachypnea, with respirations greater than 20 per minute and Leukocytosis, with WBC greater than 12,000  Source is pulmonary  Sepsis protocol initiated  Pt has HFrEF with significant BLE edema.  Fluid resuscitation with caution.  IV antibiotics as appropriate for source of sepsis  While organ dysfunction may be noted elsewhere in this problem list or in the chart, degree of organ dysfunction does not meet CMS criteria for severe sepsis

## 2022-01-30 NOTE — PROGRESS NOTES
Received Bedside report. Assumed care at 1900. This pt is AOx4, voiding via condom catheter, 0/10 pain. Patient and RN discussed plan of care: questions answered. Labs noted, assessment complete, patient strict NPO. Tele box in place. Pt is on 10 L of O2 via Oxymask. Call light in place, fall precautions in place, patient educated on importance of calling for assistance. No additional needs at this time. VSS

## 2022-01-30 NOTE — PROGRESS NOTES
1630: Dr. Dey notified of BP: 87/47 in R arm, 75/50 L arm. MD to come to bedside to assess.    1637: /52  L arm, 83/53  R arm. Per MD, no bolus at this time, continue to monitor. Per MD, notify if SBP < 85.

## 2022-01-30 NOTE — ASSESSMENT & PLAN NOTE
*Upon presentation to ED. Pt complained of 2-day history of chest pain which resolved during pt encounter on floor. Troponins elevated consistently.  EKG show new left anterior fascicular block.  Also ST depression noted in anterior leads.  Patient has a history of CAD and prior inferior MI with no stents place and no h/o CABG. Cardiology states that patient's symptoms and objective findings unlikely to be acute coronary syndrome, but rather more consistent to hypoxic insult to underlying CAD in the setting of pneumonia and sepsis.  Cardiology therefore does not recommend aggressive interventions such as PCI and does not recommend heparin drip.  Recommended ECHO which showed EF 25-30% and findings consistent with ischemic cardiomyopathy. Given QTc>500ms, no azithromycin is recommended at this time. Procalcitonin 5.47 -->8.73  - Cefepime/Doxycycline  - Titrate O2 between 88-92%  - Consider trending procalciotnin daily.

## 2022-01-30 NOTE — PROGRESS NOTES
Daily Progress Note:     Date of Service: 1/29/2022  Primary Team: [facility] Blue Team   Attending: XAVIER Padilla M.D.   Senior Resident: Dr. Padilla  Intern: Dr. Dey  Contact:  [phone]    Chief Complaint:  SOB    ID:  Mr. 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 Elevated troponins (>100s) noted in ED with EKG changes consistent with ST depression noted anteriorly.  Cardiology consulted in AM who thinks that findings do not correlate with ACS, but rather CAD in the setting of GNR sepsis/PNA.  Cardiology does not recommend aggressive intervention. Recommend ECHO.  On Cefepdime/Doxy for GNR bacteremia to cover empirically for pseudomonas.    Subjective:  Overnight pt has been tachypneic with RR 18-31 wearing oxymask up to 10 LPM.  BP range 87//64.  HR .  Patient was initially presented with SpO2 80% on room air.  Patient states that he did feel short of breath until he was placed on supplemental oxygen.  Patient does not use supplemental oxygen at home.  Pt reported 2-day history upon presentation to ED, but denied any chest pain during admission.  Denies fever, chills, abd pain, N/V/D, or syncope.  Pt also c/o R hip pain.  Denies any trauma to R hip.  States that hip pain radiates down lateral aspect of R thigh.    Interval History:  In ED, WBC 14.6-->16.8. ANC 12.69.   Troponins 106-->109-->101-->116.  EKG showed ST depression in anterior leads.  Patient was given enough aspirin to add up to 325 mg.  Cardiology consulted.  Per cardiologist, patient's symptoms and objective findings are more consistent with CAD in the setting of hypoxic insult secondary to pneumonia/sepsis than acute coronary syndrome.  Recommend ECHO or nuclear stress test. ECHO ordered.  EF 25-30% with findings consistent with ischemic cardiomyopathy.    Patient treated with ceftriaxone+Azithromycin initially which was changed to ceftriaxone+doxycycline  because QTc noted to be greater than 500 ms.  BCx resulted positive for GNR.  Ceftriaxone d/alexei.  Cefepime ordered.  Sputum culture negative.    Consultants/Specialty:  Cardiology    Review of Systems:    Review of Systems   Constitutional: Negative for chills and fever.   HENT: Negative for sore throat.    Respiratory: Positive for shortness of breath. Negative for cough.    Cardiovascular: Positive for chest pain.   Gastrointestinal: Negative for abdominal pain, diarrhea, nausea and vomiting.   Musculoskeletal: Negative for joint pain and myalgias.   Neurological: Negative for dizziness and headaches.       Objective Data:   Physical Exam:   Vitals:   Temp:  [36.1 °C (96.9 °F)-36.5 °C (97.7 °F)] 36.2 °C (97.1 °F)  Pulse:  [] 91  Resp:  [16-31] 31  BP: ()/(44-64) 83/53  SpO2:  [90 %-97 %] 90 %    Physical Exam  Vitals and nursing note reviewed.   Constitutional:       General: He is not in acute distress.     Appearance: He is toxic-appearing.   HENT:      Head: Normocephalic and atraumatic.      Mouth/Throat:      Mouth: Mucous membranes are dry.   Eyes:      Extraocular Movements: Extraocular movements intact.      Conjunctiva/sclera: Conjunctivae normal.      Pupils: Pupils are equal, round, and reactive to light.   Cardiovascular:      Rate and Rhythm: Rhythm irregular.      Heart sounds: No murmur heard.  No friction rub. No gallop.    Pulmonary:      Comments: Coarse breath sounds auscultated throughout all lung fields and worse at the bases.  Abdominal:      General: Abdomen is flat. There is no distension.      Palpations: Abdomen is soft.   Musculoskeletal:         General: Normal range of motion.   Skin:     General: Skin is warm.      Capillary Refill: Capillary refill takes less than 2 seconds.      Coloration: Skin is not jaundiced.   Neurological:      General: No focal deficit present.      Mental Status: He is oriented to person, place, and time.      Cranial Nerves: No cranial nerve  deficit.      Sensory: No sensory deficit.      Motor: No weakness.      Comments: Unable to test coordination or Romberg.           Labs:   Recent Results (from the past 24 hour(s))   LACTIC ACID    Collection Time: 01/28/22  8:38 PM   Result Value Ref Range    Lactic Acid 2.1 (H) 0.5 - 2.0 mmol/L   Basic Metabolic Panel    Collection Time: 01/28/22  8:38 PM   Result Value Ref Range    Sodium 141 135 - 145 mmol/L    Potassium 3.8 3.6 - 5.5 mmol/L    Chloride 106 96 - 112 mmol/L    Co2 17 (L) 20 - 33 mmol/L    Glucose 80 65 - 99 mg/dL    Bun 31 (H) 8 - 22 mg/dL    Creatinine 1.22 0.50 - 1.40 mg/dL    Calcium 8.6 8.5 - 10.5 mg/dL    Anion Gap 18.0 (H) 7.0 - 16.0   TROPONIN    Collection Time: 01/28/22  8:38 PM   Result Value Ref Range    Troponin T 106 (H) 6 - 19 ng/L   ESTIMATED GFR    Collection Time: 01/28/22  8:38 PM   Result Value Ref Range    GFR If African American >60 >60 mL/min/1.73 m 2    GFR If Non  57 (A) >60 mL/min/1.73 m 2   Basic Metabolic Panel (BMP)    Collection Time: 01/29/22  2:27 AM   Result Value Ref Range    Sodium 141 135 - 145 mmol/L    Potassium 3.8 3.6 - 5.5 mmol/L    Chloride 109 96 - 112 mmol/L    Co2 17 (L) 20 - 33 mmol/L    Glucose 94 65 - 99 mg/dL    Bun 36 (H) 8 - 22 mg/dL    Creatinine 1.22 0.50 - 1.40 mg/dL    Calcium 8.9 8.5 - 10.5 mg/dL    Anion Gap 15.0 7.0 - 16.0   CBC without Differential    Collection Time: 01/29/22  2:27 AM   Result Value Ref Range    WBC 16.8 (H) 4.8 - 10.8 K/uL    RBC 3.90 (L) 4.70 - 6.10 M/uL    Hemoglobin 12.7 (L) 14.0 - 18.0 g/dL    Hematocrit 38.5 (L) 42.0 - 52.0 %    MCV 98.7 (H) 81.4 - 97.8 fL    MCH 32.6 27.0 - 33.0 pg    MCHC 33.0 (L) 33.7 - 35.3 g/dL    RDW 52.6 (H) 35.9 - 50.0 fL    Platelet Count 164 164 - 446 K/uL    MPV 10.8 9.0 - 12.9 fL   TROPONIN    Collection Time: 01/29/22  2:27 AM   Result Value Ref Range    Troponin T 109 (H) 6 - 19 ng/L   ESTIMATED GFR    Collection Time: 01/29/22  2:27 AM   Result Value Ref Range    GFR  If African American >60 >60 mL/min/1.73 m 2    GFR If Non  57 (A) >60 mL/min/1.73 m 2   Culture Respiratory W/ GRM STN    Collection Time: 22  6:00 AM    Specimen: Sputum; Respirate   Result Value Ref Range    Significant Indicator NEG     Source RESP     Site SPUTUM     Culture Result -     Gram Stain Result       Many WBCs.  Rare epithelial cells.  Specimen Quality Score: 3+  Moderate mixed bacteria, no predominant organism seen.     GRAM STAIN    Collection Time: 22  6:00 AM    Specimen: Respirate   Result Value Ref Range    Significant Indicator .     Source RESP     Site SPUTUM     Gram Stain Result       Many WBCs.  Rare epithelial cells.  Specimen Quality Score: 3+  Moderate mixed bacteria, no predominant organism seen.     TROPONIN    Collection Time: 22  7:10 AM   Result Value Ref Range    Troponin T 101 (H) 6 - 19 ng/L   PROCALCITONIN    Collection Time: 22  7:10 AM   Result Value Ref Range    Procalcitonin 8.73 (H) <0.25 ng/mL   EKG    Collection Time: 22  9:38 AM   Result Value Ref Range    Report       [facility]    Test Date:  2022  Pt Name:    NINA CAMPBELL                Department: 171  MRN:        [id number]                      Room:       T710  Gender:     Male                         Technician: [facility]  :        [id number]                   Requested By:SY ORELLANA  Order #:    [id number]                    Reading MD: Mayra Natarajan    Measurements  Intervals                                Axis  Rate:       89                           P:          84  MT:         180                          QRS:        -66  QRSD:       140                          T:          16  QT:         448  QTc:        546    Interpretive Statements  SINUS RHYTHM  PVC  PACs  ATYPICAL RBBB  LAFB  BASELINE WANDERING  Electronically Signed On 2022 10:00:16 PST by Mayra Natarajan     EC-ECHOCARDIOGRAM COMPLETE W/ CONT    Collection Time: 22 11:27 AM    Result Value Ref Range    Eject.Frac. MOD BP 27.51     Eject.Frac. MOD 4C 28.66     Eject.Frac. MOD 2C 32.19     Left Ventrical Ejection Fraction 25    TROPONIN    Collection Time: 22  2:21 PM   Result Value Ref Range    Troponin T 116 (H) 6 - 19 ng/L   EKG    Collection Time: 22  6:44 PM   Result Value Ref Range    Report       [facility]    Test Date:  2022  Pt Name:    NINA CAMPBELL                Department: [facility]  MRN:        [id number]                      Room:       10  Gender:     Male                         Technician: [employer]  :        [id number]                   Requested By:DAHIANA THOMAS  Order #:    [id number]                    Reading MD:    Measurements  Intervals                                Axis  Rate:       89                           P:          75  KY:         192                          QRS:        -65  QRSD:       128                          T:          49  QT:         412  QTc:        502    Interpretive Statements  SINUS RHYTHM  RBBB AND LAFB  ARTIFACT IN LEAD(S) I,II,III,aVR,aVL,aVF,V1,V3,V4,V6  Compared to ECG 2022 09:38:45  Ventricular premature complex(es) no longer present         Imaging:   Independant Imaging Review: Completed  CT-HEAD W/O   Final Result      1.  No acute intracranial abnormality is identified.   2.  There are mild periventricular and subcortical white matter changes present.  This finding is nonspecific and could be from previous small vessel ischemia, demyelination, or gliosis.   3.  Atrophy   4.  Paranasal sinus disease.            EC-ECHOCARDIOGRAM COMPLETE W/ CONT   Final Result      DX-CHEST-PORTABLE (1 VIEW)   Final Result      1.  Consolidation within the right lung base.      2.  Bilateral interstitial infiltrates.      3.  Small right pleural effusion.      4.  Cardiomegaly.      DX-FEMUR-2+ RIGHT   Final Result      No evidence of fracture or bone lesion.      DX-PELVIS-1 OR 2 VIEWS   Final Result      No evidence of  fracture or dislocation.          Problem Representation:      Pneumonia- (present on admission)  NSTEMI (non-ST elevated myocardial infarction) (HCC)- (present on admission)  Assessment & Plan  *Upon presentation to ED. Pt complained of 2-day history of chest pain which resolved during pt encounter on floor. Troponins elevated consistently.  EKG show new left anterior fascicular block.  Also ST depression noted in anterior leads.  Patient has a history of CAD and prior inferior MI with no stents place and no h/o CABG. Cardiology states that patient's symptoms and objective findings unlikely to be acute coronary syndrome, but rather more consistent to hypoxic insult to underlying CAD in the setting of pneumonia and sepsis.  Cardiology therefore does not recommend aggressive interventions such as PCI and does not recommend heparin drip.  Recommended ECHO which showed EF 25-30% and findings consistent with ischemic cardiomyopathy. Given QTc>500ms, no azithromycin is recommended at this time. Procalcitonin 5.47 -->8.73.  Aspiration pneumonia is currently being considered. SLP recommended NPO.    - Cefepime/Doxycycline  - Titrate O2 between 88-92%      - Consider trending procalcitonin daily.    Acute on chronic respiratory failure (HCC)- (present on admission)  Assessment & Plan  *Pt complains of shortness of breath for the past two weeks.  Not on supplemental oxygen at home.  PRN albuterol at home.  On initial presentation satting at 80% on room air requiring up to 10 LPM on oxymask just to increase SpO2% above 90%.  Patient reports smoking smoking 1 ppd since age of 17.  CXR shows some evidence of emphysematous changes.  RT consult ordered to optimize respiratory meds and supplemental O2.  - continue albuterol PRN  - continue supplemental O2 titrated 88-92%    Sepsis (formerly [facility])  Assessment & Plan  Assessment & Plan  This is Sepsis Present on admission  SIRS criteria identified on my evaluation include: Tachycardia, with  heart rate greater than 90 BPM, Tachypnea, with respirations greater than 20 per minute and Leukocytosis, with WBC greater than 12,000  Source is pulmonary  Sepsis protocol initiated  Pt has HFrEF with significant BLE edema.  Fluid resuscitation with caution.  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  - no heparin products.  - SCDs    Alcohol use- (present on admission)  Assessment & Plan  *Pt reports a h/o EtOH use.  Denies drinking reporting that he quit 1.5 years ago.  No need to initiate CIWA protocol especially in the setting of acute on chronic hypoxic respiratory failure.  If patient later shows withdrawal symptoms, will consider CIWA but only if SpO2 WNL.  However, supplementing with vitamins would benefit patient especially if patient happens to have multiple vitamin deficiencies.  - multipe vitamins  - folic acid  - thaimine     Right hip pain- (present on admission)  *pt complains of R hip pain for the past 2 months.  On exam, TTP R hip.  Pain worse with ROM.  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

## 2022-01-30 NOTE — ASSESSMENT & PLAN NOTE
*pt complains of R hip pain for the past 2 months.  On exam, TTP R hip.  Pain worse with ROM.  - continue to monitor  - Tylenol

## 2022-01-30 NOTE — CARE PLAN
The patient is Watcher - Medium risk of patient condition declining or worsening    Shift Goals  Clinical Goals: Monitor respiratory status, wean O2 as tolerated, antibiotic therapy  Patient Goals: Rest  Family Goals: TRISTON    Progress made toward(s) clinical / shift goals:    Problem: Care Map:  Optimal Outcome for the Pneumonia Patient  Goal: Collection and monitoring of appropriate tests and labs  Outcome: Progressing  Sputum sample collected and sent to lab.   Problem: Hemodynamics - Pneumonia  Goal: Patient's hemodynamics, fluid balance and neurologic status will be stable or improve  Outcome: Progressing  VSS.   Problem: Knowledge Deficit - Standard  Goal: Patient and family/care givers will demonstrate understanding of plan of care, disease process/condition, diagnostic tests and medications  Outcome: Progressing  Pt educated on plan of care for the day, verbalizes understanding.   Problem: Skin Integrity  Goal: Skin integrity is maintained or improved  Outcome: Progressing  Pt turns self side to side in bed frequently.     Patient is not progressing towards the following goals:  Problem: Risk for Aspiration  Goal: Patient's risk for aspiration will be absent or decrease  Outcome: Not Progressing  Pt NPO based on concern for aspiration. NPO status initiated by this RN this morning due to pt's slurred speech and coughing when sipping thin liquids. Pt evaluated by SLP; recommend continued NPO status and re-evaluation tomorrow as pt was very fatigued during swallow evaluation.

## 2022-01-30 NOTE — PROGRESS NOTES
Monitor Summary:   SR 71-95  O/F-PVC, R-PAC, Coup, Trip, Big, Trig  2 episodes of rate-dependent BBB   .17/.14/.39      *Imaging of strip not populating in Epic*

## 2022-01-30 NOTE — ASSESSMENT & PLAN NOTE
*Upon presentation to ED. Pt complained of 2-day history of chest pain which resolved during pt encounter on floor. Troponins elevated consistently.  EKG show new left anterior fascicular block and ST depression noted in anterior leads.  Patient has a history of CAD and prior inferior MI with no stents place; referred for CABG but did not F/U.  *Cardiology recommends medical treatment   *ECHO 1/29  EF 25-30% and findings consistent with ischemic cardiomyopathy.   *Etiology 2/2 to demand ischemia due to sepsis and acute hypoxic respiratory failure, see above  *treat underlying cause.  - 81 mg aspirin  - simvistatin  - imdur  - lisinopril  - carvedilol

## 2022-01-30 NOTE — PROGRESS NOTES
Monitor Summary per monitor room  SR 79-99 with BBB  (F) PVC, (O) Big, (R) Trig  Multifocal vtach 3 beats  6 beats vtach  5 beats Vtach  .16/.12/.43

## 2022-01-30 NOTE — ASSESSMENT & PLAN NOTE
*Pt reports a h/o EtOH use.  Denies drinking reporting that he quit 1.5 years ago.  No need to initiate CIWA protocol especially in the setting of acute on chronic hypoxic respiratory failure.  If patient later shows withdrawal symptoms, will consider CIWA but only if SpO2 WNL.  However, supplementing with vitamins would benefit patient especially if patient happens to have multiple vitamin deficiencies.  - multipe vitamins  - folic acid  - thaimine

## 2022-01-31 NOTE — PROGRESS NOTES
Patient is being verbally aggressive towards staff attempting IV access.  Patient adamantly refusing IV.  Two RN education provided regarding benefits of IV for antibiotic administration and risk of refusal.     On call MD paged for notification.  Awaiting call back

## 2022-01-31 NOTE — DOCUMENTATION QUERY
"                                                                         [facility]                                                                       Query Response Note      PATIENT:               NINA CAMPBELL  ACCT #:                  [id number]  MRN:                     [id number]  :                      1940  ADMIT DATE:       2022 2:01 PM  DISCH DATE:          RESPONDING  PROVIDER #:        [id number]           QUERY TEXT:    \"HFrEF with significant BLE edema\" is documented in the  Progress Note. Can the acuity of heart failure be clarified?     NOTE:  If the appropriate response is not listed below, please respond with a new note.    The patient's Clinical Indicators include:  Per  Progress note:  Pt has HFrEF with significant BLE edema.  PMH remarkable for CAD complicated by inferior MI , HFrEF (EF 25-30% 22)   NT-proBNP 25356   Echo: EF 25-30%, reduced right ventricular systolic function, findings consistent w/ ischemic cardiomyopathy.  Reduced right ventricular systolic function    Cardiac electrophysiology consult: no peripheral edema or extremity lymphadenopathy   Risk Factors: history of CHF, s/p IVF resuscitation , pneumonia  Treatment: IV Lasix , coreg, lisinopril     Thank you,  Guanaco Ontiveros RN, BSN  Clinical   Connect via [employer]  Options provided:   -- Acute on chronic HFrEF   -- Chronic HFrEF   -- Unable to Determine      Query created by: Guanaco Ontiveros on 2022 9:33 AM    RESPONSE TEXT:    Chronic HFrEF          Electronically signed by:  SY ORELLANA MD 2022 9:36 AM              "

## 2022-01-31 NOTE — DISCHARGE PLANNING
Anticipated Discharge Disposition: SNF when clear    Action: RN CM met with the patient at bedside to discuss the discharge plan.  Patient provided his correct phone number at [phone].  Patient confirmed that his home address is correct.  RN CM explained that the patient will likely need SNF placement.  Patient confirmed that he has previously stayed at [facility], [facility] (before they changed their name to [place]), and [place].  Patient did not want to provide SNF choices yet, he said that his [facility]  named Miranda would be coming to visit him tomorrow.  RN CM will round back with the patient tomorrow, PT/OT notes will be needed before sending SNF referrals.  Chart review shows that the patient does receive [facility] services, but that he is not service connected.      Barriers to Discharge: SNF placement     Plan: Case coordination to continue to follow up with medical team to discuss discharge barriers.

## 2022-01-31 NOTE — PROGRESS NOTES
Monitor Summary  SR  71-98  1st degree HB  BBB  (O-F) PVC, (O) PAC, (O) Big, (R) Trig, (R) Coup  .21/.15/.47

## 2022-01-31 NOTE — PROGRESS NOTES
Daily Progress Note:     Date of Service: 1/30/2022  Primary Team: [facility] Blue Team   Attending: XAVIER Padilla M.D.   Senior Resident: Dr. KASSANDRA Barry MD  Intern: Dr. JAMAL Dey MD  Contact:  [phone]    Chief Complaint:   R. 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 Influenza bacteremia, admitted for acute hypoxic respiratory failure    Subjective:  Pt reports feeling hungry and thirsty, no other complaints.      Consultants/Specialty:  none  Review of Systems:   Review of Systems   Constitutional: Negative for chills and fever.   HENT: Negative for hearing loss, sinus pain and tinnitus.    Eyes: Negative for blurred vision and double vision.   Respiratory: Positive for cough, sputum production and shortness of breath. Negative for hemoptysis.    Cardiovascular: Negative for chest pain, palpitations and orthopnea.   Gastrointestinal: Negative for heartburn and nausea.   Genitourinary: Negative for dysuria and urgency.   Musculoskeletal: Negative for myalgias and neck pain.   Skin: Negative for itching and rash.   Neurological: Negative for dizziness, tingling and headaches.   Endo/Heme/Allergies: Negative for environmental allergies. Does not bruise/bleed easily.   Psychiatric/Behavioral: Negative for depression and suicidal ideas.       Objective Data:   Physical Exam:   Vitals:   Temp:  [36.3 °C (97.3 °F)-37.2 °C (99 °F)] 37.2 °C (99 °F)  Pulse:  [85-96] 86  Resp:  [18-30] 20  BP: ()/(39-64) 109/49  SpO2:  [88 %-98 %] 97 %    General: Frail elderly man In no acute distress  HEENT: NC/AT.  PERRLA, EOMI.  External ear normal.  Nares patent, nonedematous nonerythematous.  Moist mucous membranes.  Trachea midline.   Neck: No JVP.  Carotid  bruit could not be appreciated.  Nontender, nonnodular thyroid.  No anterior or posterior cervical, occipital, supraclavicular lymphadenopathy  Cardiovascular: PMI<2 cm at fifth costal space at midclavicular line.  No heaves appreciated.  No thrills.  RRR W/O M, R, G.  Pulmonary: Normal work of breathing.  Resonant to percussion.  Crackles over RML field, otherwise CTAB,   Abdomen: Flat, soft, nondistended.  Normoactive bowel sounds.  Nontender to percussion or palpation.  No hepatosplenomegaly noted.  No fluid wave  Musculoskeletal: Normal tone reduced bulk.  Full ROM.  Skin: Warm and dry.  No rashes, bruises or lacerations noted  Neuro: Alert and oriented X4.  CN II-XII checked and intact.  5 out of 5 strength throughout.  DTR 2+ throughout.  FTN, HTS intact.  Sensation intact . negative Romberg.  Lymphatic: No edema or lymphadenopathy noted.  Psychiatric: Good mood congruent affect.  Thought form linear, content appropriate    Labs:   Recent Labs     01/28/22  1531 01/29/22 0227 01/30/22 0136   WBC 14.6* 16.8* 19.8*   RBC 4.18* 3.90* 3.70*   HEMOGLOBIN 13.5* 12.7* 11.9*   HEMATOCRIT 40.2* 38.5* 36.8*   MCV 96.2 98.7* 99.5*   MCH 32.3 32.6 32.2   RDW 50.1* 52.6* 52.9*   PLATELETCT 186 164 189   MPV 11.0 10.8 11.5   NEUTSPOLYS 86.90*  --   --    LYMPHOCYTES 6.10*  --   --    MONOCYTES 7.00  --   --    EOSINOPHILS 0.00  --   --    BASOPHILS 0.00  --   --    RBCMORPHOLO Present  --   --      Recent Labs     01/28/22 2038 01/29/22 0227 01/30/22 0136   SODIUM 141 141 146*   POTASSIUM 3.8 3.8 3.6   CHLORIDE 106 109 111   CO2 17* 17* 19*   GLUCOSE 80 94 79   BUN 31* 36* 43*     BCx: H. 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 Influenza bacteremia, admitted for acute hypoxic respiratory failure      Sepsis ([facility])  Assessment & Plan  Assessment & Plan  This is Sepsis Present on admission  SIRS criteria identified on my evaluation include: Tachycardia, with heart rate greater than 90 BPM, Tachypnea, with respirations greater than 20 per minute and Leukocytosis, with WBC greater than 12,000  Source is pulmonary  Sepsis protocol initiated  Pt has HFrEF with significant BLE edema.  Fluid resuscitation with caution.  IV antibiotics as appropriate for source of sepsis  While organ dysfunction may be noted elsewhere in this problem list or in the chart, degree of organ dysfunction does not meet CMS criteria for severe sepsis    * Acute on chronic respiratory failure (HCC)- (present on admission)  * RML H influenza pneumonia  * H influenza bacteremia  Assessment & Plan  *on 10 LPM O2 for Sat 88-92  -D/C cefepime  -D/C doxycycline  -Start unasyn q6h for 7 days  - continue albuterol PRN  - continue supplemental O2 titrated 88-92%    NSTEMI (non-ST elevated myocardial infarction) ([facility])- (present on admission)  Assessment & Plan  *Upon presentation to ED. Pt complained of 2-day history of chest pain which resolved during pt encounter on floor. Troponins elevated consistently.  EKG show new left anterior fascicular block and ST depression noted in anterior leads.  Patient has a history of CAD and prior inferior MI with no stents place; referred for CABG but did not F/U.  -Treated medically per crdiology   - ECHO 1/29  EF 25-30% and findings consistent with ischemic cardiomyopathy. -   -Etiology 2/2 to demand ischemia due to sepsis and acute hypoxic respiratory failure, see above  -treat underlying cause.  -QTC >500 no azithromycin.    Right hip pain- (present on admission)  Assessment & Plan   *pt complains of R hip pain for the past 2 months.  On exam, TTP R hip.  Pain worse with ROM.  - continue to monitor  -  Tylenol          HIT (heparin-induced thrombocytopenia) ([facility])  Assessment & Plan  *HIT document in 2015 on EMR.  - no heparin products.  - SCDs      Alcohol use- (present on admission)  Assessment & Plan  *Pt reports a h/o EtOH use.  Denies drinking reporting that he quit 1.5 years ago.  No need to initiate CIWA protocol especially in the setting of acute on chronic hypoxic respiratory failure.  If patient later shows withdrawal symptoms, will consider CIWA but only if SpO2 WNL.  However, supplementing with vitamins would benefit patient especially if patient happens to have multiple vitamin deficiencies.  - multipe vitamins  - folic acid  - bree

## 2022-01-31 NOTE — PROGRESS NOTES
"Received page from RN around 8 :20 pm that patient is refusing placement of IV line.  Patient has sepsis due to right lung pneumonia. Blood cultures showed H.influenza  Apparently his iv line got infiltrated and he was supposed to get a midline which got delayed. Patient is due for IV Unasyn and RN was trying to place an IV access.  I saw the patient at bedside. Discussed that he has an infection which needs iv Abx but patient adamantly refused having the iv line despite of telling complications including spread of infections and even death.patient states \" I don't care even if I die\".   Patient has capacity to make decision. In this situation , I think trying oral Antibiotic would be the only option for now .Primary team to discuss with the patient in the am for further treatment plan.    Update @10;00 pm , Discussed with pharmacy who recommended  Levaquin as oral Augmentin has less efficacy for H.inf bacteremia . I have informed him all the complications associated if infection is not treated. I also explained to him the side effects and that the frequency would be very convenient as once every 48 hours. Patient states \" I don't care about side effects, I just don't like taking pills, just leave me\".   I will order oral Levaquin. Have discussed with RN. If patient refuses to take the medication- will be documented. Again, he is AO x 4 ,  has the capacity to make decisions.  "

## 2022-01-31 NOTE — PROGRESS NOTES
Monitor summary:        Rhythm: SR   Rate: 69-96  Ectopy: (f)PVC, (o)COUP, (o)bigem  Measurements: 20./.15/.44        12hr chart check

## 2022-01-31 NOTE — PROGRESS NOTES
Received Bedside report. Assumed care at 1900. This pt is AOx4, 0/10 pain. Patient and RN discussed plan of care: questions answered. Labs noted, assessment complete, patient tolerating level 4--pureed diet with level 2 mildly thick liquids. Tele box in place. Pt is on 6 L of O2 via Oxymask. Call light in place, fall precautions in place, patient educated on importance of calling for assistance. No additional needs at this time. VSS

## 2022-02-01 PROBLEM — R11.0 NAUSEA: Status: ACTIVE | Noted: 2022-01-01

## 2022-02-01 NOTE — DISCHARGE PLANNING
Anticipated Discharge Disposition: Hospice (likely with SNF placement)    [phone] and explained that the patient will need hospice arrangements.  Priscilla clarified that this patient is in their system as a , and he can still have hospice arrangements made despite not being service connected.  Priscilla asked that clinicals and therapy notes be faxed to her at [phone], RN CM stated that a fax will be sent once today's therapy notes have been entered.     1538- Hospice referral with PT/OT/SLP notes faxed to Priscilla at [phone].    Barriers to Discharge: Hospice arrangements     Plan: Case coordination to continue to follow up with medical team to discuss discharge barriers.

## 2022-02-01 NOTE — PROGRESS NOTES
12 hour chart check.    Have a great shift!    MONITOR SUMMARY:    SR with BBB 78-92  (f)PVC, (r)PAC  .19/.12/.45

## 2022-02-01 NOTE — PROGRESS NOTES
Handoff report received from day shift nurse. Patient care assumed. Patient is currently resting in bed. Plan of care discussed with the patient and the patient verbalizes no questions at this time. Patient is A&O x4, 2L oxymask, telemetry monitoring in place and rhythm verified, and vital signs are stable. Patient denies any pain at this time. Call light and belongings within reach, bed in lowest and locked position, and patient educated on the use of call light. Will continue plan of care.

## 2022-02-01 NOTE — THERAPY
Speech Language Pathology   Video Swallow Evaluation     Patient Name: Osmar Medina  AGE:  81 y.o., SEX:  male  Medical Record #: [id number]  Today's Date: 2022     Precautions  Precautions: Fall Risk,Swallow Precautions ( See Comments)    Diet Prior to Evaluation:    Oral diet (PU4/MT2)     Subjective:  Pt asleep upon entrance, easily awakened and followed directions.     Discussed with the risks, benefits, and alternatives of the MBSS procedure. Patient acknowledged and agreed to proceed.    Assessment  Videofluoroscopic Swallow Study was conducted in the lateral projection to evaluate oropharyngeal swallow function. A radiology tech was present to assist with the procedure.     Oxygen Requirements:  2L Oxymask  Behavior:  Alert, Cooperative  Secretion Management: WNL  Dentition: Edentulous upper and lower dentures in place    Positioning: seated upright in fluoroscopy chair  Bolus Administration: SLP and Patient  PO barium contrast trials: Varibar thin liquid, Varibar nectar (mildly thick) liquid, Varibar pudding, solid coated in Varibar pudding    Oral Phase: Pt used anterior lingual surface to masticate, laborious A-P transport. Lingual hesitation prior to swallow initiation. Base of tongue residue across trials. Loss of bolus control to the pyriform sinuses with liquids, and over the tip of the epiglottis with puree.     Pharyngeal Phase: Delayed laryngeal vestibule closure, incomplete epiglottal inversion, weak base of tongue retraction, weak laryngeal elevation and reduced pharyngeal constriction resulted in the followin. Piecemeal swallowing with premature spillage to the level of the pyriform sinuses resulting in aspiration between swallows from premature spillage overflowing from pyriform sinuses and down posterior trachea with immediate cough response with thins by tsp.    2. Premature spillage to below the level of the vocal folds and the pyriform sinuses resulted in aspiration before the  swallow with cough response with mildly thick by cup.  3. Penetration of puree during the swallow with spontaneous ejection.  4. Mild base of tongue, mod-severe vallecular, and mild posterior pharyngeal wall residue    Compensatory strategies: Cough/throat clear, reduce bolus size, multiple swallows, effortful swallow, 3 second prep, MTL wash, and breath hold assessed. Pt presenting with strong cough which was effective in clearing some but not all aspirated material. Reduction of bolus size to tsp was effective in eliminating penetration/aspiration with MTL. Multiple swallows was effective in reducing residue but not eliminating it (2-3 swallows for MTL, 4-5 swallows for puree with mild-mod residue remaining). Effortful swallow ineffective, suspect related to weakness. 3 second prep resulted in aspiration during the swallow. A MTL wash was ineffective in reducing residue. Pt did not follow directives accurately for completion of breath hold.     Penetration Aspiration Scale:    Consistency PAS Score Timing   Thin Liquid 7 During swallow   Mildly Thick Liquid 7 Pre swallow   Pudding 2 Pre swallow   1     No contrast enters airway  2     Contrast enters the airway, remains above the vocal folds, and is ejected from the airway (not seen in the airway at the end of the swallow).  3     Contrast enters the airway, remains above the vocal folds, and is not ejected from the airway (is seen in the airway after the swallow).  4     Contrast enters the airway, contacts the vocal folds, and is ejected from the airway.  5     Contrast enters the airway, contacts the vocal folds, and is not ejected from the airway  6     Contrast enters the airway, crosses the plane of the vocal folds, and is ejected from the airway.  7     Contrast enters the airway, crosses the plane of the vocal folds, and is not ejected from the airway despite effort.  8     Contrast enters the airway, crosses the plane of the vocal folds, is not ejected  from the airway and there is no response to aspiration.    Clinical Impressions  The pt presents with a moderate-severe oropharyngeal swallow, likely acute related to acute pneumonia, weakness, and fatigue. Swallow safety is preserved; swallow efficiency is impaired. Risk for aspiration PNA is moderate and moderate for malnutrition/dehydration. A modified diet is indicated at this time.. Swallow prognosis is fair given acute PNA, limited mobility, weakness and fatigue. Pt appears to be a good candidate for exercise-based and behavioral swallow rehabilitation.     Recommendations   1. Liquidized/mildly thick liquid diet BY TSP (NO PUDDING!)  2.  Swallowing Instructions & Precautions:   Supervision: 1:1 feeding with constant supervision  Positioning: Fully upright and midline during oral intake  Medication: Crush in applesauce,   Strategies: Small bites/sips, Slow rate of intake, No straws, Liquids by teaspoon only, Multiple swallows (x 2-3) per bite/sip   Oral Care: Q4h  3.  Completion of swallow exercises targeting base of tongue retraction, laryngeal elevation and pharyngeal constriction.   4. Diligent oral care and mobilization per PT/OT recs to mitigate risk of aspiration pneumonia.   5. HOLD PO with any difficulty, s/sx of aspiration, or lethargy.   SLP following.   .      Plan    Recommend Speech Therapy 3 times per week until therapy goals are met for the following treatments:  Dysphagia Training and Patient / Family / Caregiver Education.    Discharge Recommendations: (P) Recommend post-acute placement for additional speech therapy services prior to discharge home       Objective    Please see flow sheet.

## 2022-02-01 NOTE — CARE PLAN
The patient is Stable - Low risk of patient condition declining or worsening    Shift Goals  Clinical Goals: improve patient compliance with care  Patient Goals: rest, comfort  Family Goals: No family present    Progress made toward(s) clinical / shift goals:  improve patient compliance with care  Problem: Respiratory  Goal: Patient will achieve/maintain optimum respiratory ventilation and gas exchange  Outcome: Progressing     Problem: Risk for Aspiration  Goal: Patient's risk for aspiration will be absent or decrease  Outcome: Progressing     Problem: Skin Integrity  Goal: Skin integrity is maintained or improved  Outcome: Progressing       Patient is not progressing towards the following goals:      Problem: Knowledge Deficit - Standard  Goal: Patient and family/care givers will demonstrate understanding of plan of care, disease process/condition, diagnostic tests and medications  Outcome: Not Progressing

## 2022-02-01 NOTE — PROGRESS NOTES
Daily Progress Note:     Date of Service: 1/31/2022  Primary Team: [facility] Blue Team   Attending: XAVIER Padilla M.D.   Senior Resident: Dr. KASSANDRA Barry MD  Intern: Dr. JAMAL Dey MD  Contact:  [phone]    Chief Complaint:   R. 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 Influenza bacteremia, admitted for acute hypoxic respiratory failure    Subjective:  Pt is very upset because of the frequency of hospital staff checking up on him.  Pt refuses any further blood draws and also refused pIV and midline placement for IV abx.  Pt is agreeable to PO medications.      Interval History:    PT/OT ordered. SNF referral ordered.  D/alexei unasyn. 750 mg Levoquin Q48 hours.      Consultants/Specialty:  none  Review of Systems:   Review of Systems   Constitutional: Negative for chills and fever.   HENT: Negative for hearing loss, sinus pain and tinnitus.    Eyes: Negative for blurred vision and double vision.   Respiratory: Positive for cough, sputum production and shortness of breath. Negative for hemoptysis.    Cardiovascular: Negative for chest pain, palpitations and orthopnea.   Gastrointestinal: Negative for heartburn and nausea.   Genitourinary: Negative for dysuria and urgency.   Musculoskeletal: Negative for myalgias and neck pain.   Skin: Negative for itching and rash.   Neurological: Negative for dizziness, tingling and headaches.   Endo/Heme/Allergies: Negative for environmental allergies. Does not bruise/bleed easily.   Psychiatric/Behavioral: Negative for depression and suicidal ideas.       Objective Data:   Physical Exam:   Vitals:   Temp:  [36.2 °C (97.1 °F)-37.2 °C (99 °F)] 36.8 °C (98.2 °F)  Pulse:  [72-95] 93  Resp:  [17-22] 22  BP:  (106-138)/(49-66) 137/66  SpO2:  [90 %-98 %] 93 %    General: Frail elderly man In no acute distress  HEENT: NC/AT.  PERRLA, EOMI.  External ear normal.  Nares patent, nonedematous nonerythematous.  Moist mucous membranes.  Trachea midline.   Neck: No JVP.  Carotid bruit could not be appreciated.  Nontender, nonnodular thyroid.  No anterior or posterior cervical, occipital, supraclavicular lymphadenopathy  Cardiovascular: PMI<2 cm at fifth costal space at midclavicular line.  No heaves appreciated.  No thrills.  RRR W/O M, R, G.  Pulmonary: Normal work of breathing.  Resonant to percussion.  Crackles over RML field, otherwise CTAB,   Abdomen: Flat, soft, nondistended.  Normoactive bowel sounds.  Nontender to percussion or palpation.  No hepatosplenomegaly noted.  No fluid wave  Musculoskeletal: Normal tone reduced bulk.  Full ROM.  Skin: Warm and dry.  No rashes, bruises or lacerations noted  Neuro: Alert and oriented X4.  CN II-XII checked and intact.  5 out of 5 strength throughout.  DTR 2+ throughout.  FTN, HTS intact.  Sensation intact . negative Romberg.  Lymphatic: No edema or lymphadenopathy noted.  Psychiatric: Good mood congruent affect.  Thought form linear, content appropriate    Labs:   Recent Labs     01/29/22 0227 01/30/22 0136 01/31/22 0226   WBC 16.8* 19.8* 17.7*   RBC 3.90* 3.70* 3.49*   HEMOGLOBIN 12.7* 11.9* 11.2*   HEMATOCRIT 38.5* 36.8* 33.5*   MCV 98.7* 99.5* 96.0   MCH 32.6 32.2 32.1   RDW 52.6* 52.9* 50.8*   PLATELETCT 164 189 196   MPV 10.8 11.5 10.9   NEUTSPOLYS  --   --  85.00*   LYMPHOCYTES  --   --  7.30*   MONOCYTES  --   --  6.50   EOSINOPHILS  --   --  0.20   BASOPHILS  --   --  0.20     Recent Labs     01/29/22  0227 01/30/22  0136 01/31/22  0226   SODIUM 141 146* 147*   POTASSIUM 3.8 3.6 3.6   CHLORIDE 109 111 114*   CO2 17* 19* 23   GLUCOSE 94 79 112*   BUN 36* 43* 39*     BCx: H. Influenza    Imaging:     No new imaging.    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 Influenza bacteremia, admitted for acute hypoxic respiratory failure      Sepsis (HCC)  Assessment & Plan  Assessment & Plan  This is Sepsis Present on admission  SIRS criteria identified on my evaluation include: Tachycardia, with heart rate greater than 90 BPM, Tachypnea, with respirations greater than 20 per minute and Leukocytosis, with WBC greater than 12,000  Source is pulmonary  Sepsis protocol initiated  Pt has HFrEF with significant BLE edema.  Fluid resuscitation with caution.  IV antibiotics as appropriate for source of sepsis  While organ dysfunction may be noted elsewhere in this problem list or in the chart, degree of organ dysfunction does not meet CMS criteria for severe sepsis    * Acute on chronic respiratory failure (HCC)- (present on admission)  * RML H influenza pneumonia  * H influenza bacteremia  Assessment & Plan  *on 10 LPM O2 for Sat 88-92  - Pt refuses pIV access and midline placement.  D/c unasyn  - patient started on levoquin Q48 hours  - pt refuses blood draws. Unable to repeat CBCs  - continue albuterol PRN  - continue supplemental O2 titrated 88-92%    NSTEMI (non-ST elevated myocardial infarction) ([facility])- (present on admission)  Assessment & Plan  *Upon presentation to ED. Pt complained of 2-day history of chest pain which resolved during pt encounter on floor. Troponins elevated consistently.  EKG show new left anterior fascicular block and ST depression noted in anterior leads.  Patient has a history of CAD and prior inferior MI with no stents place; referred for CABG but did not F/U.  -Treated medically per crdiology   - ECHO 1/29  EF 25-30% and findings consistent with ischemic cardiomyopathy. -   -Etiology 2/2 to demand ischemia due  to sepsis and acute hypoxic respiratory failure, see above  -treat underlying cause.  -QTC >500 no azithromycin.    Right hip pain- (present on admission)  Assessment & Plan   *pt complains of R hip pain for the past 2 months.  On exam, TTP R hip.  Pain worse with ROM.  - continue to monitor  - Tylenol      HIT (heparin-induced thrombocytopenia) ([facility])  Assessment & Plan  *HIT document in 2015 on EMR.  - no heparin products.  - SCDs      Alcohol use- (present on admission)  Assessment & Plan  *Pt reports a h/o EtOH use.  Denies drinking reporting that he quit 1.5 years ago.  No need to initiate CIWA protocol especially in the setting of acute on chronic hypoxic respiratory failure.  If patient later shows withdrawal symptoms, will consider CIWA but only if SpO2 WNL.  However, supplementing with vitamins would benefit patient especially if patient happens to have multiple vitamin deficiencies.  - multipe vitamins  - folic acid  - bree

## 2022-02-01 NOTE — DISCHARGE PLANNING
PASRR [id number]    LOC [id number]     PASRR and LOC were previously done with the patient's first name spelled as Naif with 2 Ls

## 2022-02-01 NOTE — PROGRESS NOTES
Handoff report received. Assumed patient care. PT is reclined in bed, AAOx4, no complaints of pain at this time. Tele box is on,  POC discussed with PT and all questions addressed. Safety precautions in place. Call light and personal belongings within reach. Educated to call for assistance if needed.

## 2022-02-01 NOTE — THERAPY
Occupational Therapy   Initial Evaluation     Patient Name: Osmar Medina  Age:  81 y.o., Sex:  male  Medical Record #: [id number]  Today's Date: 2/1/2022     Precautions: (P) Fall Risk,Swallow Precautions ( See Comments)    Assessment  Patient is 81 y.o. male admitted with R chest pain found to have RML PNA and H with PMHx of CAD, MI, COPD, CVA. Pt presented today with weakness, poor UE coordination, impaired balance, decreased endurance, dysphasia (this is likely baseline from CVA), and poor safety awareness impacting ADLs and mobility. Pt required min A with LB dressing (able to demonstrate figure 4 position, but poor  strength), min A for bed mobility, and mod A with functional txfs. He reports independence with self cares at baseline (primarily completes stand pivot txfs from power chair, and stands for limited times during cooking tasks) with assist from caregiver 3x/wk for IADLs. Recommend post acute placement at this time (per CM, pt considering SNF with hospice). Will continue to follow for skilled OT.     Plan    Recommend Occupational Therapy 3 times per week until therapy goals are met for the following treatments:  Adaptive Equipment, Cognitive Skill Development, Neuro Re-Education / Balance, Self Care/Activities of Daily Living, Therapeutic Activities and Therapeutic Exercises.    DC Equipment Recommendations: (P) Unable to determine at this time  Discharge Recommendations: (P) Recommend post-acute placement for additional occupational therapy services prior to discharge home        02/01/22 5845   Prior Living Situation   Prior Services [facility] Home Aide Services   Housing / Facility 1 Story Apartment / Condo   Steps Into Home 0   Bathroom Set up Bathtub / Shower Combination;Shower Chair   Equipment Owned 4-Wheel Walker;Power Wheel Chair;Tub / Shower Seat   Lives with - Patient's Self Care Capacity Alone and Unable to Care For Self   Comments Pt has assist for IADLs via caregiver 2x/wk. Pt  reports independence with self cares and primarily stand pivots to surfaces (reports propping in the kitchen for cooking)   Prior Level of ADL Function   Self Feeding Independent   Grooming / Hygiene Independent   Bathing Independent   Dressing Independent   Toileting Independent   Prior Level of IADL Function   Medication Management Independent   Laundry Requires Assist   Kitchen Mobility Requires Assist   Finances Requires Assist   Home Management Requires Assist   Shopping Requires Assist   Prior Level Of Mobility Independent With Device in Home   Precautions   Precautions Fall Risk;Swallow Precautions ( See Comments)   Cognition    Cognition / Consciousness X   Speech/ Communication Slurred   Level of Consciousness Alert   New Learning Impaired   Attention Impaired   Comments Pt has hx of CVA with residual dysphasia. Requires increased time for verbal expression (this is baseline)   Active ROM Upper Body   Active ROM Upper Body  WDL   Strength Upper Body   Upper Body Strength  X   Comments generalized BUE weakness   Coordination Upper Body   Coordination X   Comments poor BUE gross coordination   Balance Assessment   Sitting Balance (Static) Fair +   Sitting Balance (Dynamic) Fair   Standing Balance (Static) Fair -   Standing Balance (Dynamic) Poor -   Weight Shift Sitting Fair   Weight Shift Standing Poor   Comments with FWW   Bed Mobility    Supine to Sit Minimal Assist   Scooting Minimal Assist   ADL Assessment   Grooming Minimal Assist;Seated   Upper Body Dressing Minimal Assist   Lower Body Dressing Minimal Assist   Toileting Minimal Assist   How much help from another person does the patient currently need...   6 Clicks Daily Activity Score 18   Functional Mobility   Sit to Stand Moderate Assist   Bed, Chair, Wheelchair Transfer Moderate Assist   Transfer Method Stand Pivot   Activity Tolerance   Sitting in Chair up in chair post session   Sitting Edge of Bed 20 min   Standing 1 min   Patient / Family  Goals   Patient / Family Goal #1 to get stronger   Short Term Goals   Short Term Goal # 1 Pt will demo ADL txf min A   Short Term Goal # 2 Pt will complete 10 min seated G/H supervision   Short Term Goal # 3 Pt will complete FB dressing supervision   Education Group   Role of Occupational Therapist Patient Response Patient;Acceptance;Explanation   Problem List   Problem List Decreased Active Daily Living Skills;Decreased Homemaking Skills;Decreased Upper Extremity Strength Right;Decreased Upper Extremity Strength Left;Decreased Functional Mobility;Decreased Activity Tolerance;Impaired Postural Control / Balance;Safety Awareness Deficits / Cognition

## 2022-02-01 NOTE — PROGRESS NOTES
Daily Progress Note:     Date of Service: 2/1/2022  Primary Team: [employer] [facility] Blue Team   Attending: XAVIER Padilla M.D.   Senior Resident: Dr. KASSANDRA Barry MD  Intern: Dr. JAMAL Dey MD  Contact:  [phone]    Chief Complaint:   R. 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 Influenza bacteremia, admitted for acute hypoxic respiratory failure    Subjective:  No acute overnight events.  Today pt c/o nausea.  Pt states that he wishes that he could have a short walk.       Interval History:    Transdermal scopalamine patch ordered. Pending PT/OT to help patient exercise and/or ambulate.    Consultants/Specialty:  none  Review of Systems:   Review of Systems   Constitutional: Negative for chills and fever.   HENT: Negative for hearing loss, sinus pain and tinnitus.    Eyes: Negative for blurred vision and double vision.   Respiratory: Positive for cough, sputum production and shortness of breath. Negative for hemoptysis.    Cardiovascular: Negative for chest pain, palpitations and orthopnea.   Gastrointestinal: Negative for heartburn and nausea.   Genitourinary: Negative for dysuria and urgency.   Musculoskeletal: Negative for myalgias and neck pain.   Skin: Negative for itching and rash.   Neurological: Negative for dizziness, tingling and headaches.   Endo/Heme/Allergies: Negative for environmental allergies. Does not bruise/bleed easily.   Psychiatric/Behavioral: Negative for depression and suicidal ideas.       Objective Data:   Physical Exam:   Vitals:   Temp:  [36.4 °C (97.5 °F)-36.8 °C (98.3 °F)] 36.5 °C (97.7 °F)  Pulse:  [79-93] 92  Resp:  [12-22] 12  BP: (105-156)/(54-73) 105/63  SpO2:  [92 %-98 %] 94 %    General: Frail elderly man In no acute  distress  HEENT: NC/AT.  PERRLA, EOMI.  External ear normal.  Nares patent, nonedematous nonerythematous.  Moist mucous membranes.  Trachea midline.   Neck: No JVP.  Carotid bruit could not be appreciated.  Nontender, nonnodular thyroid.  No anterior or posterior cervical, occipital, supraclavicular lymphadenopathy  Cardiovascular: PMI<2 cm at fifth costal space at midclavicular line.  No heaves appreciated.  No thrills.  RRR W/O M, R, G.  Pulmonary: Normal work of breathing.  Resonant to percussion.  Crackles over RML field, otherwise CTAB,   Abdomen: Flat, soft, nondistended.  Normoactive bowel sounds.  Nontender to percussion or palpation.  No hepatosplenomegaly noted.  No fluid wave  Musculoskeletal: Normal tone reduced bulk.  Full ROM.  Skin: Warm and dry.  No rashes, bruises or lacerations noted  Neuro: Alert and oriented X4.  CN II-XII checked and intact.  5 out of 5 strength throughout.  DTR 2+ throughout.  FTN, HTS intact.  Sensation intact . negative Romberg.  Lymphatic: No edema or lymphadenopathy noted.  Psychiatric: Good mood congruent affect.  Thought form linear, content appropriate    Labs:   Recent Labs     01/30/22 0136 01/31/22 0226   WBC 19.8* 17.7*   RBC 3.70* 3.49*   HEMOGLOBIN 11.9* 11.2*   HEMATOCRIT 36.8* 33.5*   MCV 99.5* 96.0   MCH 32.2 32.1   RDW 52.9* 50.8*   PLATELETCT 189 196   MPV 11.5 10.9   NEUTSPOLYS  --  85.00*   LYMPHOCYTES  --  7.30*   MONOCYTES  --  6.50   EOSINOPHILS  --  0.20   BASOPHILS  --  0.20     Recent Labs     01/30/22  0136 01/31/22 0226 02/01/22  0311   SODIUM 146* 147* 148*   POTASSIUM 3.6 3.6 3.6   CHLORIDE 111 114* 112   CO2 19* 23 20   GLUCOSE 79 112* 103*   BUN 43* 39* 28*     BCx: H. Influenza    Imaging:     No new imaging.    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 Influenza bacteremia, admitted for acute hypoxic respiratory failure      Sepsis (HCC)  Assessment & Plan  Assessment & Plan  This is Sepsis Present on admission  SIRS criteria identified on my evaluation include: Tachycardia, with heart rate greater than 90 BPM, Tachypnea, with respirations greater than 20 per minute and Leukocytosis, with WBC greater than 12,000  Source is pulmonary  Sepsis protocol initiated  Pt has HFrEF with significant BLE edema.  Fluid resuscitation with caution.  IV antibiotics as appropriate for source of sepsis  While organ dysfunction may be noted elsewhere in this problem list or in the chart, degree of organ dysfunction does not meet CMS criteria for severe sepsis    * Acute on chronic respiratory failure (HCC)- (present on admission)  * RML H influenza pneumonia  * H influenza bacteremia  Assessment & Plan  *on 2-2.5 LPM O2 for Sat 88-92  - Pt refuses pIV access and midline placement.  D/c unasyn  - patient started on levoquin Q48 hours  - pt refuses blood draws. Unable to repeat CBCs  - continue albuterol PRN  - continue supplemental O2 titrated 88-92%    NSTEMI (non-ST elevated myocardial infarction) ([facility])- (present on admission)  Assessment & Plan  *Upon presentation to ED. Pt complained of 2-day history of chest pain which resolved during pt encounter on floor. Troponins elevated consistently.  EKG show new left anterior fascicular block and ST depression noted in anterior leads.  Patient has a history of CAD and prior inferior MI with no stents place; referred for CABG but did not F/U.  -Treated medically per crdiology   - ECHO 1/29  EF 25-30% and findings consistent with ischemic cardiomyopathy. -   -Etiology 2/2 to demand ischemia due to sepsis and acute hypoxic respiratory failure, see above  -treat underlying cause.  -QTC >500 no azithromycin.    Nausea  *pt complained of nausea.  No vomiting.  -  transdermal scopalamine patch.    Right hip pain- (present on admission)  Assessment & Plan   *pt complains of R hip pain for the past 2 months.  On exam, TTP R hip.  Pain worse with ROM.  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  - no heparin products.  - SCDs      Alcohol use- (present on admission)  Assessment & Plan  *Pt reports a h/o EtOH use.  Denies drinking reporting that he quit 1.5 years ago.  No need to initiate CIWA protocol especially in the setting of acute on chronic hypoxic respiratory failure.  If patient later shows withdrawal symptoms, will consider CIWA but only if SpO2 WNL.  However, supplementing with vitamins would benefit patient especially if patient happens to have multiple vitamin deficiencies.  - multipe vitamins  - folic acid  - thaimine     Core Measures:  PIV: refuses pIV, midline placement.  Code Status: DNAR/DNI  DVT PPx: SCDs  GI PPx: Bowel Prep  Disposition: Hospital day 4.

## 2022-02-01 NOTE — CARE PLAN
Problem: Care Map:  Optimal Outcome for the Pneumonia Patient  Goal: Collection and monitoring of appropriate tests and labs  Outcome: Progressing     Problem: Respiratory  Goal: Patient will achieve/maintain optimum respiratory ventilation and gas exchange  Outcome: Progressing  Flowsheets  Taken 2/1/2022 0709 by Pauly Carbajal, R.NKelsi  Deep Breathe and Cough: Needs Coaching  Taken 2/1/2022 0400 by Alba Smith RKelsiNKelsi  O2 Delivery Device: Oxymask     Problem: Risk for Aspiration  Goal: Patient's risk for aspiration will be absent or decrease  Outcome: Progressing  Flowsheets (Taken 2/1/2022 0709)  Aspiration Prevention:   Assessed for signs and symptoms of aspiration   Supervised feedings  Note: Aspiration precautions in place     Problem: Knowledge Deficit - Standard  Goal: Patient and family/care givers will demonstrate understanding of plan of care, disease process/condition, diagnostic tests and medications  Outcome: Progressing  Note: Patient is educated of disease process and condition. Treatment team has included patient in plan of care. All medications indications and side effects are explained. Patient is encouraged to ask questions. Patient indicates understanding.    The patient is Watcher - Medium risk of patient condition declining or worsening    Shift Goals  Clinical Goals: Maintain hemodynamic stability  Patient Goals: Rest/sleep  Family Goals: TRISTON    Progress made toward(s) clinical / shift goals:  ongoing    Patient is not progressing towards the following goals:

## 2022-02-01 NOTE — THERAPY
Physical Therapy   Initial Evaluation     Patient Name: Osmar Medina  Age:  81 y.o., Sex:  male  Medical Record #: [id number]  Today's Date: 2/1/2022     Precautions  Precautions: Fall Risk;Swallow Precautions    Assessment  Patient is 81 y.o. male who presented acutely c/o R sided chest pain, SOB, R hip pain that radiates down his thigh (imaging negative for acute fracture).  Today patient reported he has not ambulated in ~2 months, has only been able to use power chair and stand statically for ~10-15 min to prepare meals.  Patient required min A to get out of bed, mod A to stand pivot transfer to chair.  Patient appears to lock B knees in extension /hyperextesion when standing.  Educated patient on importance of increasing OOB activity to progress strength and assist with respiratory deficits.  Patient would benefit from continued acute PT and post acute placement to address impairments in strength, balance, coordination, activity tolerance, and maximize safety with functional mobility.    Plan    Recommend Physical Therapy 3 times per week until therapy goals are met for the following treatments:  Bed Mobility, Equipment, Gait Training, Neuro Re-Education / Balance, Self Care/Home Evaluation, Therapeutic Activities and Therapeutic Exercises    DC Equipment Recommendations: Unable to determine at this time  Discharge Recommendations: Recommend post-acute placement for additional physical therapy services prior to discharge home     Objective     02/01/22 6817   Prior Living Situation   Prior Services assisted Home Aide Services (Caregiver for cleaning, laundry)   Housing / Facility 1 Story Apartment / Condo   Steps Into Home 0(ramp)   Equipment Owned 4-Wheel Walker;Power Wheel Chair   Lives with - Patient's Self Care Capacity Alone and Unable to Care For Self   Comments Pt reported for the last 2 months he has only been able to transfer in/out of power chair, uses it in home and in community.  Pt reported he has  not ambulated in ~2 months, able to stand for ~10-15 min to cook meals   Prior Level of Functional Mobility   Bed Mobility Independent   Transfer Status Independent   Assistive Devices Used Power Wheel Chair   Wheelchair Independent   Cognition    Cognition / Consciousness X   Speech/ Communication Slurred (slow speech to compensate for speech difficulties)   Level of Consciousness Alert   New Learning Impaired   Attention Impaired   Comments Pleasant & cooperative, difficult to understand at times   Active ROM Lower Body    Active ROM Lower Body  WDL   Strength Lower Body   Lower Body Strength  WDL   Comments functionally tested, B knee ext 4/5   Sensation Lower Body   Lower Extremity Sensation   WDL   Balance Assessment   Sitting Balance (Static) Fair +   Sitting Balance (Dynamic) Fair   Standing Balance (Static) Fair -   Standing Balance (Dynamic) Fair -   Weight Shift Sitting Fair   Weight Shift Standing Poor   Comments w/ FWW   Gait Analysis   Gait Level Of Assist Unable to Participate   Weight Bearing Status No restrictions   Bed Mobility    Supine to Sit Minimal Assist   Sit to Supine (NT, left up in chair)   Scooting Supervised (seated EOB)   Comments HOB elevated   Functional Mobility   Sit to Stand Moderate Assist   Bed, Chair, Wheelchair Transfer Moderate Assist   Transfer Method Stand Pivot   Mobility supine > sit > STS > chair   Comments cues for sequencing transfer, appears to extend/hyperextend B knees when standing   Activity Tolerance   Sitting in Chair 10+ min (post session)   Sitting Edge of Bed 15 min   Standing < 2 min   Short Term Goals    Short Term Goal # 1 Pt will perform supine <> sit without bed features with SPV within 6 visits in order to progress toward PLOF   Short Term Goal # 2 Pt will perform STS with FWW and SPV within 6 visits in order to progress OOB activity   Short Term Goal # 3 Pt will perform functional transfers with FWW and SPV within 6 visits in order to progress toward  PLOF   Session Information   Date / Session Number  2/1 - 1 (1/3, 2/7)

## 2022-02-01 NOTE — PROGRESS NOTES
Monitor summary:        Rhythm: SR-ST  Rate:   Ectopy: (R)PVC, (r)COUP   Measurements: 14./.14/.40        12hr chart check

## 2022-02-02 PROBLEM — R78.81 GRAM-NEGATIVE BACTEREMIA: Status: ACTIVE | Noted: 2022-01-01

## 2022-02-02 PROBLEM — A41.9 SEPSIS (HCC): Chronic | Status: ACTIVE | Noted: 2022-01-01

## 2022-02-02 PROBLEM — Z71.89 ENCOUNTER FOR HOSPICE CARE DISCUSSION: Status: ACTIVE | Noted: 2022-01-01

## 2022-02-02 NOTE — PROGRESS NOTES
12 hour chart check.    Have a great shift!    MONITOR SUMMARY:    SR 67-86 with BBB  PAC, PVC  .17/.14/.44

## 2022-02-02 NOTE — ASSESSMENT & PLAN NOTE
*Hospice referral placed 2/1/22.   and  are coordinating placement into [facility] to arrange hospice care.  Patient is agreeable to SNF placement and hospice care.  - SNF in process of being selected.

## 2022-02-02 NOTE — DISCHARGE PLANNING
Received Choice form at 1252  Agency/Facility Name: [facility]  Referral sent per Choice form @ 1301    RN CM notified

## 2022-02-02 NOTE — CARE PLAN
Problem: Nutritional:  Goal: Achieve adequate nutritional intake  Description: Advance diet per MD/SLP. Patient will consume >50% of meals  Outcome: Not Met     Diet advanced to Level 3 (PO by TSP only with Level 2 mildly thick liquids). Pt still with poor reported PO with 0-50% x 3 meals. Wt is up from stated admit wt. RD to add mildly thick Ready Shake Milk shake to trays TID to bolster nutrition. RD continues to monitor for adequate PO

## 2022-02-02 NOTE — DISCHARGE PLANNING
Anticipated Discharge Disposition: Hospice (likely with SNF placement)    Action: FABIANO VALENTE [facility]  [facility]iscilla stated that she did received the faxed hospice referral, and she will follow up once the [facility].    Barriers to Discharge: Hospice arrangements     Plan: Case coordination to continue to follow up with medical team to discuss discharge barriers.

## 2022-02-02 NOTE — PROGRESS NOTES
Handoff report received from day shift nurse. Patient care assumed. Patient is currently resting in bed. Plan of care discussed with the patient and the patient verbalizes no questions at this time. Patient is A&O x4, on 2L oxymask, telemetry monitoring in place and rhythm verified, and vital signs are stable. Patient denies any pain at this time. Call light and belongings within reach, bed in lowest and locked position, and patient educated on the use of call light. Will continue plan of care.

## 2022-02-02 NOTE — CARE PLAN
The patient is Watcher - Medium risk of patient condition declining or worsening    Shift Goals  Clinical Goals: Take meds  Patient Goals: Rest  Family Goals: TRISTON    Progress made toward(s) clinical / shift goals:      Problem: Respiratory  Goal: Patient will achieve/maintain optimum respiratory ventilation and gas exchange  Outcome: Progressing     Problem: Risk for Aspiration  Goal: Patient's risk for aspiration will be absent or decrease  Outcome: Progressing     Problem: Hemodynamics - Pneumonia  Goal: Patient's hemodynamics, fluid balance and neurologic status will be stable or improve  Outcome: Progressing       Patient is not progressing towards the following goals:

## 2022-02-02 NOTE — PROGRESS NOTES
Monitor summary:     SR/ST  w/ a BBB  (R) PAC/ bigem/ trigem/ coup  (O) PVC/ PAC  (F) PAC/ PVC  .18/.15/.44

## 2022-02-02 NOTE — DISCHARGE PLANNING
Agency/Facility Name: [facility]   Spoke To: Pema   Outcome: DPA called regarding patients referral for Hospice. Hospice staff Pema requested a callback number to forward to admissions if they need to reach me for discuss further details.    RN SIDRA notified

## 2022-02-02 NOTE — DISCHARGE PLANNING
Anticipated Discharge Disposition: Hospice (likely with SNF placement)    Action: FABIANO VALENTE [facility]  Priscilla explained that they contract with outside agencies for hospice care, so a hospice referral will need to be sent out from our end to one of the community hospice agencies.  Priscilla also explained that [facility] was unsure of what the qualifying hospice diagnosis is.  RN CM provided Priscilla's number ([phone]) to Dr. Barry.  FABIANO VALENTE met with the patient at bedside to go over hospice choice.   Patient provided choice for any hospice agency, starting with [facility].  Referral faxed to [facility], choice form faxed to [facility].    Barriers to Discharge: Hospice arrangements     Plan: Case coordination to continue to follow up with medical team to discuss discharge barriers.

## 2022-02-03 NOTE — DISCHARGE PLANNING
Anticipated Discharge Disposition: Hospice with SNF placement    Action: RN CM received a call back from [facility].  Priscilla stated that the patient has been approved to have the [facility] pay for hospice.  RN CM explained that referrals are pending with [facility], and [facility] currently has no hospice beds.  Priscilla asked that she be notified at [phone] once we have an accepting facility so that the [facility] can provide payment authorization.    Barriers to Discharge: Placement     Plan: Case coordination to continue to follow up with medical team to discuss discharge barriers.

## 2022-02-03 NOTE — DISCHARGE PLANNING
Anticipated Discharge Disposition: Hospice with SNF placement    Action: Case discussed with [facility] RN Cortez, patient has been accepted.  [facility], and [facility] for SNF placement w/hospice.  RN CM sent a SNF referral to [facility], sent updated referrals to [facility].    1333- Hospice RN Cortez requested that his note be sent to [facility], note was faxed to [phone].    Barriers to Discharge: Placement     Plan: Case coordination to continue to follow up with medical team to discuss discharge barriers.

## 2022-02-03 NOTE — PROGRESS NOTES
Handoff report received. Assumed patient care. PT is reclined in bed, AAOx2, to person and place. Tele box is off, patient is medical. POC discussed with PT and all questions addressed. Safety precautions in place. Call light and personal belongings within reach. Educated to call for assistance if needed.

## 2022-02-03 NOTE — PROGRESS NOTES
Received bedside report from RN, pt care assumed, VSS. Patient asleep in bed, calm/unlabored breathing. No signs of acute distress noted at this time. Bed locked/in lowest position, ICU bed alarm on, call light within reach, hourly rounding initiated.

## 2022-02-03 NOTE — THERAPY
"Speech Language Pathology  Daily Treatment     Patient Name: Osmar eMdina  Age:  81 y.o., Sex:  male  Medical Record #: [id number]  Today's Date: 2/3/2022     Precautions  Precautions: (P) Fall Risk,Swallow Precautions ( See Comments)    Assessment    Pt seen this date for dysphagia intervention to check diet tolerance as a high follow-up after diagnostic swallow study. Per RN, pt intermittently accepting PO by tsp but is more confused today and refusing things. Pt's dysarthria worse as compared to previous session, requiring max cues to slow rate of speech and over-articulate sounds. PO trials of MTL and LQ3 by teaspoon assessed. Pt required max cues for double swallow. Coughing appreciated with MTL by tsp without double swallow. Attempted to teach swallow exercises, however, pt unable to follow directions to complete and perseverating on \"taking fourth street\".     Recommendations  1. Liquidized/mildly thick liquid diet BY TSP (NO PUDDING!)  2.  Swallowing Instructions & Precautions:   Supervision: 1:1 feeding with constant supervision  Positioning: Fully upright and midline during oral intake  Medication: Crush in applesauce,   Strategies: Small bites/sips, Slow rate of intake, No straws, Liquids by teaspoon only, Multiple swallows (x 2-3) per bite/sip   Oral Care: Q4h  3.  Completion of swallow exercises targeting base of tongue retraction, laryngeal elevation and pharyngeal constriction.   4. Diligent oral care and mobilization per PT/OT recs to mitigate risk of aspiration pneumonia.   5. HOLD PO with any difficulty, s/sx of aspiration, or lethargy.     SLP following.     Plan    Continue current treatment plan.    Discharge Recommendations: (P) Recommend post-acute placement for additional speech therapy services prior to discharge home    Subjective    Pt confused this date, attempting to eat pulse ox, repetitively asking if clinician was going \"to take fourth there\". Pt not redirectable despite max cues. Pt " "reporting date and \"March 29th 2020\".      Objective       02/03/22 1455   Dysphagia    Dysphagia X   Positioning / Behavior Modification Self Monitoring;Other (see Comments);Multiple Swallows;Modulate Rate or Bite Size;Cough / Clear after Swallow  (PO by tsp only)   Other Treatments PO trials of MTL and LQ3 by tsp   Diet / Liquid Recommendation Mildly Thick (2) - (Nectar Thick);Liquidised (3) - (Nectar Thick Full Liquid)  (by tsp only)   Nutritional Liquid Intake Rating Scale Thickened beverages (mildly thick unless otherwise specified)   Nutritional Food Intake Rating Scale Total oral diet of a single consistency   Nursing Communication Swallow Precaution Sign Posted at Head of Bed   Skilled Intervention Compensatory Strategies;Verbal Cueing   Recommended Route of Medication Administration   Medication Administration  Other (See Comments)  (crush in applesauce)   Patient / Family Goals   Patient / Family Goal #1 Water    Goal #1 Outcome Progressing slower than expected   Short Term Goals   Short Term Goal # 1 B  Pt will consume LQ3/MT2 diet by tsp with no s/sx of aspiration and min cues.    Goal Outcome  # 1 B Progressing slower than expected   Short Term Goal # 2      Short Term Goal # 3 Pt will complete 10 reps each of swallow exercises targeting BoT retraction, pharyngeal constriction, and laryngeal elevation with min cues and \"fair\" accuracy   Goal Outcome  # 3 Goal not met         "

## 2022-02-03 NOTE — HOSPICE
"Pt approved for community hospice by Dr. Amezquita for Acute on Chronic hypoxic respiratory failure with related ischemic cardiomyopathy, when a safe d/c plan is established.  \"...81 year old man with PMHx remarkable for CAD complicated by inferior MI 2013 (referred for CABG, did not proceed), HFrEF (EF 25-30% 1/29/22), COPD, CVA with residual dysphagia, HIT and non compliance who presented with R sided chest pain found to have RML PNA and H. Influenza bacteremia, admitted for acute hypoxic respiratory failure.\"    Prior to this hospitalization pt was living independently without O2.  He is now dependent in all ADL's & requiring 2L/NC.  BMI 19.96, with temporal wasting & not eating the majority of meals.  When he does eat, it is only 25-50%.  PPS 30%. RR 18-28.  "

## 2022-02-03 NOTE — CARE PLAN
The patient is Watcher - Medium risk of patient condition declining or worsening    Shift Goals  Clinical Goals: remain free from injury,   Patient Goals: rest  Family Goals: TRISTON, no family present     Progress made toward(s) clinical / shift goals:    Problem: Respiratory  Goal: Patient will achieve/maintain optimum respiratory ventilation and gas exchange  Outcome: Progressing     Problem: Risk for Aspiration  Goal: Patient's risk for aspiration will be absent or decrease  Outcome: Progressing       Patient is not progressing towards the following goals:

## 2022-02-03 NOTE — THERAPY
Occupational Therapy  Daily Treatment     Patient Name: Osmar Medina  Age:  81 y.o., Sex:  male  Medical Record #: [id number]  Today's Date: 2/3/2022     Precautions: Fall Risk,Swallow Precautions ( See Comments)    Assessment    Pt agreeable to session however presented with increased confusion today with poor command following, poor motor planning, decreased initiation, and increased dysarthria. Pt able to accurately identify common objects but used them inappropriately (I.e. placed comb in mouth). Pt able to copy UB exercises with max verbal and tactile cues. Deferred OOB activity today 2/2 change in mentation, RN notified. Will continue to follow for skilled OT.    Plan    Continue current treatment plan.    DC Equipment Recommendations: (P) Unable to determine at this time  Discharge Recommendations: (P) Recommend post-acute placement for additional occupational therapy services prior to discharge home       02/03/22 9894   Precautions   Precautions Fall Risk;Swallow Precautions ( See Comments)   Cognition    Comments Increased confusion today, poor command following, attempted to place comb in mouth when asked to use it. Increased dysarthria    Supine Upper Body Exercises   Supine Upper Body Exercises Yes   Comments Able to follow commands for supine UB exercises with 50% accuracy   Bed Mobility    Scooting Moderate Assist   Skilled Intervention Verbal Cuing;Tactile Cuing;Sequencing   Comments max cues for scooting in bed   Activities of Daily Living   Grooming Maximal Assist   Skilled Intervention Verbal Cuing;Tactile Cuing;Sequencing   Comments tactile cues for iniating G/H, able to verbalize object was a comb, but not able to use object appropriately   How much help from another person does the patient currently need...   6 Clicks Daily Activity Score 13   Functional Mobility   Comments deferred mobility 2/2 fatigue and increased confusion   Patient / Family Goals   Patient / Family Goal #1 to get stronger    Goal #1 Outcome Progressing as expected   Short Term Goals   Short Term Goal # 1 Pt will demo ADL txf min A   Goal Outcome # 1 Goal not met   Short Term Goal # 2 Pt will complete 10 min seated G/H supervision   Goal Outcome # 2 Goal not met   Short Term Goal # 3 Pt will complete FB dressing supervision   Goal Outcome # 3 Goal not met   Education Group   Role of Occupational Therapist Patient Response Patient;Acceptance;Explanation

## 2022-02-03 NOTE — PROGRESS NOTES
Daily Progress Note:     Date of Service: 2/2/2022  Primary Team: [employer] [facility] Blue Team   Attending: XAVIER Padilla M.D.   Senior Resident: Dr. KASSANDRA Barry MD  Intern: Dr. JAMAL Dey MD  Contact:  [phone]    Chief Complaint:   R. 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 Influenza bacteremia, admitted for acute hypoxic respiratory failure    Subjective:  No acute overnight events.  Patient states that his shortness of breath has improved.  Complains of dry mouth, but no other acute complaints.  Nausea has resolved today.       Interval History:    750 mg levofloxacin daily until 2/8/2022 per renal dosing.  Plan to continue  and  have reached out to [facility] ([facility]).  SNF facilities are currently being considered.  Once chosen, hospice arrangements will be made. Spoke with Dr. Cardoza at 1500.  Recommends palliative consult through [facility].      Consultants/Specialty:  none  Review of Systems:   Review of Systems   Constitutional: Negative for chills and fever.   HENT: Negative for hearing loss, sinus pain and tinnitus.    Eyes: Negative for blurred vision and double vision.   Respiratory: Positive for cough, sputum production and shortness of breath. Negative for hemoptysis.    Cardiovascular: Negative for chest pain, palpitations and orthopnea.   Gastrointestinal: Negative for heartburn and nausea.   Genitourinary: Negative for dysuria and urgency.   Musculoskeletal: Negative for myalgias and neck pain.   Skin: Negative for itching and rash.   Neurological: Negative for dizziness, tingling and headaches.   Endo/Heme/Allergies: Negative for environmental allergies. Does not bruise/bleed easily.    Psychiatric/Behavioral: Negative for depression and suicidal ideas.       Objective Data:   Physical Exam:   Vitals:   Temp:  [36.1 °C (97 °F)-36.9 °C (98.5 °F)] 36.6 °C (97.8 °F)  Pulse:  [62-91] 91  Resp:  [19-28] 28  BP: (108-135)/() 108/49  SpO2:  [91 %-96 %] 91 %    General: Frail elderly man In no acute distress  HEENT: NC/AT.  PERRLA, EOMI.  External ear normal.  Nares patent, nonedematous nonerythematous.  Moist mucous membranes.  Trachea midline.   Neck: No JVP.  Carotid bruit could not be appreciated.  Nontender, nonnodular thyroid.  No anterior or posterior cervical, occipital, supraclavicular lymphadenopathy  Cardiovascular: PMI<2 cm at fifth costal space at midclavicular line.  No heaves appreciated.  No thrills.  RRR W/O M, R, G.  Pulmonary: Normal work of breathing.  Resonant to percussion.  Crackles over RML field, otherwise CTAB,   Abdomen: Flat, soft, nondistended.  Normoactive bowel sounds.  Nontender to percussion or palpation.  No hepatosplenomegaly noted.  No fluid wave  Musculoskeletal: Normal tone reduced bulk.  Full ROM.  Skin: Warm and dry.  No rashes, bruises or lacerations noted  Neuro: Alert and oriented X4.  CN II-XII checked and intact.  5 out of 5 strength throughout.  DTR 2+ throughout.  FTN, HTS intact.  Sensation intact . negative Romberg.  Lymphatic: No edema or lymphadenopathy noted.  Psychiatric: Good mood congruent affect.  Thought form linear, content appropriate    Labs:   Recent Labs     01/31/22 0226   WBC 17.7*   RBC 3.49*   HEMOGLOBIN 11.2*   HEMATOCRIT 33.5*   MCV 96.0   MCH 32.1   RDW 50.8*   PLATELETCT 196   MPV 10.9   NEUTSPOLYS 85.00*   LYMPHOCYTES 7.30*   MONOCYTES 6.50   EOSINOPHILS 0.20   BASOPHILS 0.20     Recent Labs     01/31/22 0226 02/01/22 0311   SODIUM 147* 148*   POTASSIUM 3.6 3.6   CHLORIDE 114* 112   CO2 23 20   GLUCOSE 112* 103*   BUN 39* 28*     BCx: H. Influenza    Imaging:     No new imaging.    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 Influenza bacteremia, admitted for acute hypoxic respiratory failure      * Acute on chronic respiratory failure (HCC)- (present on admission)  Assessment & Plan  *on 2 LPM O2 for Sat 88-92  - Pt refuses pIV access and midline placement.  D/c unasyn  - Levoquin daily until 2/8/22  - pt refuses blood draws. Unable to repeat CBCs  - continue albuterol PRN  - continue supplemental O2 titrated 88-92%    Pneumonia- (present on admission)  Assessment & Plan  *Patient presented with SOB breath and in ED noted to have SpO2 80% on room air.    *on 2 LPM O2 for Sat 88-92  - Pt refuses pIV access and midline placement.  D/c unasyn  - Levoquin daily until 2/8/22  - pt refuses blood draws. Unable to repeat CBCs  - continue albuterol PRN  - continue supplemental O2 titrated 88-92%    Sepsis (HCC)- (present on admission)  Assessment & Plan  Assessment & Plan  This is Sepsis Present on admission  SIRS criteria identified on my evaluation include: Tachycardia, with heart rate greater than 90 BPM, Tachypnea, with respirations greater than 20 per minute and Leukocytosis, with WBC greater than 12,000  Source is pulmonary  Sepsis protocol initiated  Pt has HFrEF with significant BLE edema.  Fluid resuscitation with caution.  IV antibiotics as appropriate for source of sepsis  While organ dysfunction may be noted elsewhere in this problem list or in the chart, degree of organ dysfunction does not meet CMS criteria for severe sepsis      Gram-negative bacteremia- (present on admission)  Assessment & Plan  *on 2 LPM O2 for Sat 88-92  - Pt refuses pIV access and midline placement.  D/c unasyn  - Levoquin daily until 2/8/22  - pt refuses blood draws. Unable to repeat CBCs  -  continue albuterol PRN  - continue supplemental O2 titrated 88-92%    NSTEMI (non-ST elevated myocardial infarction) (HCC)- (present on admission)  Assessment & Plan  *Upon presentation to ED. Pt complained of 2-day history of chest pain which resolved during pt encounter on floor. Troponins elevated consistently.  EKG show new left anterior fascicular block and ST depression noted in anterior leads.  Patient has a history of CAD and prior inferior MI with no stents place; referred for CABG but did not F/U.  *Cardiology recommends medical treatment   *ECHO 1/29  EF 25-30% and findings consistent with ischemic cardiomyopathy.   *Etiology 2/2 to demand ischemia due to sepsis and acute hypoxic respiratory failure, see above  *treat underlying cause.  - 81 mg aspirin  - simvistatin  - imdur  - lisinopril  - carvedilol    Encounter for hospice care discussion  Assessment & Plan  *Hospice referral placed 2/1/22.   and  are coordinating placement into [facility] to arrange hospice care.  Patient is agreeable to SNF placement and hospice care.  - SNF in process of being selected.  - [facility].  Recommends palliative consult at     Nausea  Assessment & Plan  *pt complained of nausea.  No vomiting. Resolved 2/2/22.  - transdermal scopalamine patch.    Right hip pain- (present on admission)  Assessment & Plan   *pt complains of R hip pain for the past 2 months.  On exam, TTP R hip.  Pain worse with ROM.  - continue to monitor  - Tylenol    HIT (heparin-induced thrombocytopenia) ([facility])  Assessment & Plan  *HIT document in 2015 on EMR.  - no heparin products.  - SCDs    Alcohol use- (present on admission)  Assessment & Plan  *Pt reports a h/o EtOH use.  Denies drinking reporting that he quit 1.5 years ago.  No need to initiate CIWA protocol especially in the setting of acute on chronic hypoxic respiratory failure.  If patient later shows withdrawal symptoms, will consider CIWA  but only if SpO2 WNL.  However, supplementing with vitamins would benefit patient especially if patient happens to have multiple vitamin deficiencies.  - multipe vitamins  - folic acid  - thaimine         Core Measures:  PIV: refuses pIV, midline placement.  Code Status: DNAR/DNI  Abd: 750 mg Levoquin daily (renally dosed)  DVT PPx: SCDs  GI PPx: Bowel Prep  Disposition: Hospital day 5.

## 2022-02-03 NOTE — CARE PLAN
Problem: Care Map:  Optimal Outcome for the Pneumonia Patient  Goal: Collection and monitoring of appropriate tests and labs  Outcome: Progressing     Problem: Respiratory  Goal: Patient will achieve/maintain optimum respiratory ventilation and gas exchange  Outcome: Progressing     Problem: Knowledge Deficit - Standard  Goal: Patient and family/care givers will demonstrate understanding of plan of care, disease process/condition, diagnostic tests and medications  Outcome: Progressing     Problem: Skin Integrity  Goal: Skin integrity is maintained or improved  Outcome: Progressing     Problem: Fall Risk  Goal: Patient will remain free from falls  Outcome: Progressing     Problem: Hemodynamics  Goal: Patient's hemodynamics, fluid balance and neurologic status will be stable or improve  Outcome: Progressing     Problem: Fluid Volume  Goal: Fluid volume balance will be maintained  Outcome: Progressing   The patient is Stable - Low risk of patient condition declining or worsening    Shift Goals  Clinical Goals: remain free from injury,   Patient Goals: rest  Family Goals: TRISTON, no family present     Progress made toward(s) clinical / shift goals:  ongoing    Patient is not progressing towards the following goals:

## 2022-02-03 NOTE — CARE PLAN
Problem: Respiratory  Goal: Patient will achieve/maintain optimum respiratory ventilation and gas exchange  Outcome: Progressing     Problem: Risk for Aspiration  Goal: Patient's risk for aspiration will be absent or decrease  Outcome: Progressing     The patient is Watcher - Medium risk of patient condition declining or worsening    Shift Goals  Clinical Goals: plan of care, medications, case management planning   Patient Goals: rest, comfort   Family Goals: TRISTON, no family present     Progress made toward(s) clinical / shift goals: Patient on 2L O2, patient A&Ox4, able to eat with 1:1 supervision. Awaiting case management plans and discharge planning. Will continue to monitor.     Patient is not progressing towards the following goals:

## 2022-02-04 NOTE — CARE PLAN
Problem: Care Map:  Optimal Outcome for the Pneumonia Patient  Goal: Collection and monitoring of appropriate tests and labs  Outcome: Progressing     Problem: Respiratory  Goal: Patient will achieve/maintain optimum respiratory ventilation and gas exchange  Outcome: Progressing     Problem: Hemodynamics - Pneumonia  Goal: Patient's hemodynamics, fluid balance and neurologic status will be stable or improve  Outcome: Progressing     Problem: Self Care  Goal: Patient will have the ability to perform ADLs independently or with assistance (bathe, groom, dress, toilet and feed)  Outcome: Not Progressing     Problem: Discharge Planning - Pneumonia  Goal: Patient will verbalize understanding of lifestyle changes and therapeutic needs post discharge  Outcome: Progressing     Problem: Knowledge Deficit - Standard  Goal: Patient and family/care givers will demonstrate understanding of plan of care, disease process/condition, diagnostic tests and medications  Outcome: Not Progressing   The patient is Watcher - Medium risk of patient condition declining or worsening    Shift Goals  Clinical Goals: Safety  Patient Goals: TRISTON  Family Goals: TRISTON, no family present     Progress made toward(s) clinical / shift goals:  ongoing      Patient is not progressing towards the following goals:      Problem: Self Care  Goal: Patient will have the ability to perform ADLs independently or with assistance (bathe, groom, dress, toilet and feed)  Outcome: Not Progressing     Problem: Knowledge Deficit - Standard  Goal: Patient and family/care givers will demonstrate understanding of plan of care, disease process/condition, diagnostic tests and medications  Outcome: Not Progressing

## 2022-02-04 NOTE — PROGRESS NOTES
Handoff report received. Assumed patient care. PT is reclined in bed, AAOx2, to person and time, no complaints of pain or discomfort. Tele box is off, patient is medical.  POC discussed with PT and all questions addressed. Safety precautions in place. Call light and personal belongings within reach. Educated to call for assistance if needed.

## 2022-02-04 NOTE — CONSULTS
Reason for Palliative Care Consult: Advance Care Planning    Consulted by: Jesika Barry M.D.    HPI: Patient is a 81 y.o. male admitted 1/28/2022 for acute hypoxic respiratory failure due pneumonia and H. Influenza bacteremia, sepsis, and STEMI with significant cardiac history. Patient has declined most interventions per records review and discharge planning underway for hospice at [facility].     Past medical/surgical history:   Past Medical History:   Diagnosis Date   • Acute inferior myocardial infarction (HCC) 3/20/2013   • Anemia 3/19/2013   • CAD (coronary artery disease) 3/11/2013   • Ischemic cardiomyopathy 3/11/2013   • Cardiac arrest (HCC) 3/11/2013   • Hyperlipidemia 3/11/2013   • ACS (acute coronary syndrome) ([facility]) 2/27/2013   • ETOH abuse    • Hypertension    • Smoker      Past Surgical History:   Procedure Laterality Date   • GASTROSCOPY-ENDO  3/22/2013    Performed by Edson Mccoy M.D. at ENDOSCOPY [facility]   • COLONOSCOPY - ENDO  3/22/2013    Performed by Edson Mccoy M.D. at ENDOSCOPY [facility]   • ANGIOPLASTY     • DC APPENDECTOMY     • ZZZ CARDIAC CATH         Additional consults:   Cardiology    Assessment:  Neuro: Unable to determine-  Dyspnea: No-    Last BM: 02/02/22-   Pain: Unable to determine-    Depression: Unable to determine-  Dementia: Unable to Determine-     Living situation & psychosocial: Unable to obtain living situation/ psychosocial history from patient. Address on file is [address] [place] [address] [address] [place] [id number]. Also noted on file is that the patient has stayed in the past at [facility] ([facility] [place].     Social History     Tobacco Use   • Smoking status: Current Every Day Smoker     Packs/day: 1.00     Types: Cigarettes   • Smokeless tobacco: Never Used   Substance Use Topics   • Alcohol use: Not Currently       Spiritual:  Is Alevism or spirituality important for coping with this illness? No-    Has a  or spiritual provider  "visit been requested? No  Sources of mainor/meaning: Involving \"work\" but unable fully understand patient due to dysarthria.     Palliative Performance Scale: 30%    Healthcare Directive Information:  Advance Directive: None on file; [facility].    DPOA: None on file; [facility] to [facility].    POLST: Not in EMR; [facility] to [facility]. Present above bed but incomplete, missing provider signature. It was originally [facility] by Dr. Alisson Woods [facility] Dr. Woods was been contacted for retroactive signature.       Code Status: DNR/DNI      Outcome:  Introduced myself and [facility] JEFFRE[facility] Devlin. Explained the role of palliative care. Patient is severely dysarthric, confused and disheveled in appearance. Upon entering room, external urinary catheter noticed on floor at bedside and all blankets were removed. Patient repeatedly picked at pulse ox during encounter. Patient's speech slightly improved after providing some oral care.  Patient was able to nod and state yes he is .  Inquired if he had any children to which he replied \"6 of, 2 I did not know about.\"  Assisted patient in covering back up and placed condom portion of condom catheter in trash. Notified RN regarding patient's catheter.    POLST form above patient's bed.  Reviewed.  Not signed by physician.  Reached out to physician Dr. Woods.  Obtained retroactive signature electronically after discussing patient.  Scanned into EMR.  Discharge planning underway for SNF with hospice.  Care coordination efforts and communicating with [facility] and [facility] planning appreciated.  Patient has been refusing most care.      Provided therapeutic communication including therapeutic simple explanation of plan of care presence/silence and attempts at reflective listening throughout encounter.     Plan: DNR/DNI; discharge planning for hospice. [facility] directives [facility] from [facility].     Recommendations: I recommend a " hospice consult.  This was placed 2/1/2022 and discharge planning underway.  Recommend comfort measures only considering patient is refusing most care and is requesting water. Efforts should be made to contact either HC POA if patient has completed a healthcare directive or next of kin.  No next of kin listed in chart.  Consider next of kin search if no [facility] at the [facility].    Thank you for allowing Palliative Care to participate in this patient's care. Please call our team with questions and/or additional needs.    Total visit time was 30 minutes discussing advance care planning.     VICKIE Campo.  Palliative Care Nurse Practitioner  [phone]

## 2022-02-04 NOTE — PROGRESS NOTES
Daily Progress Note:     Date of Service: 2/3/2022  Primary Team: [facility] IM Blue Team   Attending: XAVIER Padilla M.D.   Senior Resident: Dr. KASSANDRA Barry MD  Intern: Dr. JAMAL Dey MD  Contact:  [phone]    Chief Complaint:   R. 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 Influenza bacteremia, admitted for acute hypoxic respiratory failure    Subjective:  Pt was tachypneic overnight.  In morning encounter, pt had no new complaints.    Interval History:    Pending palliative consult.        Consultants/Specialty:  none  Review of Systems:   Review of Systems   Constitutional: Negative for chills and fever.   HENT: Negative for hearing loss, sinus pain and tinnitus.    Eyes: Negative for blurred vision and double vision.   Respiratory: Positive for cough, sputum production and shortness of breath. Negative for hemoptysis.    Cardiovascular: Negative for chest pain, palpitations and orthopnea.   Gastrointestinal: Negative for heartburn and nausea.   Genitourinary: Negative for dysuria and urgency.   Musculoskeletal: Negative for myalgias and neck pain.   Skin: Negative for itching and rash.   Neurological: Negative for dizziness, tingling and headaches.   Endo/Heme/Allergies: Negative for environmental allergies. Does not bruise/bleed easily.   Psychiatric/Behavioral: Negative for depression and suicidal ideas.       Objective Data:   Physical Exam:   Vitals:   Temp:  [36.4 °C (97.6 °F)-37.3 °C (99.2 °F)] 36.9 °C (98.4 °F)  Pulse:  [62-86] 62  Resp:  [16-22] 16  BP: (107-135)/(52-64) 107/62  SpO2:  [91 %-95 %] 91 %    General: Frail elderly man In no acute distress  HEENT: NC/AT.  PERRLA, EOMI.  External ear normal.  Nares patent, nonedematous  nonerythematous.  Moist mucous membranes.  Trachea midline.   Neck: No JVP.  Carotid bruit could not be appreciated.  Nontender, nonnodular thyroid.  No anterior or posterior cervical, occipital, supraclavicular lymphadenopathy  Cardiovascular: PMI<2 cm at fifth costal space at midclavicular line.  No heaves appreciated.  No thrills.  RRR W/O M, R, G.  Pulmonary: Normal work of breathing.  Resonant to percussion.  Crackles over RML field, otherwise CTAB,   Abdomen: Flat, soft, nondistended.  Normoactive bowel sounds.  Nontender to percussion or palpation.  No hepatosplenomegaly noted.  No fluid wave  Musculoskeletal: Normal tone reduced bulk.  Full ROM.  Skin: Warm and dry.  No rashes, bruises or lacerations noted  Neuro: Alert and oriented X4.  CN II-XII checked and intact.  5 out of 5 strength throughout.  DTR 2+ throughout.  FTN, HTS intact.  Sensation intact . negative Romberg.  Lymphatic: No edema or lymphadenopathy noted.  Psychiatric: Good mood congruent affect.  Thought form linear, content appropriate    Labs:   No results for input(s): WBC, RBC, HEMOGLOBIN, HEMATOCRIT, MCV, MCH, RDW, PLATELETCT, MPV, NEUTSPOLYS, LYMPHOCYTES, MONOCYTES, EOSINOPHILS, BASOPHILS, RBCMORPHOLO in the last 72 hours.  Recent Labs     02/01/22  0311   SODIUM 148*   POTASSIUM 3.6   CHLORIDE 112   CO2 20   GLUCOSE 103*   BUN 28*     BCx: H. Influenza    Imaging:     No new imaging.    Assessment and plan:  Mr Medina is an 81 year old man with PMHx remarkable for CAD complicated by inferior MI 2013 (referred for CABG, did not proceed), HFrEF (EF 25-30% 1/29/22), COPD, CVA with residual dysphagia, HIT and non compliance who presented with R sided chest pain found to have RML PNA and H. Influenza bacteremia, admitted for acute hypoxic respiratory failure      * Acute on chronic respiratory failure (HCC)- (present on admission)  Assessment & Plan  *on 2 LPM O2 for Sat 88-92  - Pt refuses pIV access and midline placement.  D/c unasyn  -  Levoquin daily until 2/8/22  - pt refuses blood draws. Unable to repeat CBCs  - continue albuterol PRN  - continue supplemental O2 titrated 88-92%    Pneumonia- (present on admission)  Assessment & Plan  *Patient presented with SOB breath and in ED noted to have SpO2 80% on room air.    *on 2 LPM O2 for Sat 88-92  - Pt refuses pIV access and midline placement.  D/c unasyn  - Levoquin daily until 2/8/22  - pt refuses blood draws. Unable to repeat CBCs  - continue albuterol PRN  - continue supplemental O2 titrated 88-92%    Sepsis (HCC)- (present on admission)  Assessment & Plan  Assessment & Plan  This is Sepsis Present on admission  SIRS criteria identified on my evaluation include: Tachycardia, with heart rate greater than 90 BPM, Tachypnea, with respirations greater than 20 per minute and Leukocytosis, with WBC greater than 12,000  Source is pulmonary  Sepsis protocol initiated  Pt has HFrEF with significant BLE edema.  Fluid resuscitation with caution.  IV antibiotics as appropriate for source of sepsis  While organ dysfunction may be noted elsewhere in this problem list or in the chart, degree of organ dysfunction does not meet CMS criteria for severe sepsis      Gram-negative bacteremia- (present on admission)  Assessment & Plan  *on 2 LPM O2 for Sat 88-92  - Pt refuses pIV access and midline placement.  D/c unasyn  - Levoquin daily until 2/8/22  - pt refuses blood draws. Unable to repeat CBCs  - continue albuterol PRN  - continue supplemental O2 titrated 88-92%    NSTEMI (non-ST elevated myocardial infarction) (HCC)- (present on admission)  Assessment & Plan  *Upon presentation to ED. Pt complained of 2-day history of chest pain which resolved during pt encounter on floor. Troponins elevated consistently.  EKG show new left anterior fascicular block and ST depression noted in anterior leads.  Patient has a history of CAD and prior inferior MI with no stents place; referred for CABG but did not  F/U.  *Cardiology recommends medical treatment   *ECHO 1/29  EF 25-30% and findings consistent with ischemic cardiomyopathy.   *Etiology 2/2 to demand ischemia due to sepsis and acute hypoxic respiratory failure, see above  *treat underlying cause.  - 81 mg aspirin  - simvistatin  - imdur  - lisinopril  - carvedilol    Encounter for hospice care discussion  Assessment & Plan  *Hospice referral placed 2/1/22.   and  are coordinating placement into [facility] to arrange hospice care.  Patient is agreeable to SNF placement and hospice care.  - SNF in process of being selected.  - [facility]  Recommends palliative consult at     Nausea  Assessment & Plan  *pt complained of nausea.  No vomiting. Resolved 2/2/22.  - transdermal scopalamine patch.    Right hip pain- (present on admission)  Assessment & Plan   *pt complains of R hip pain for the past 2 months.  On exam, TTP R hip.  Pain worse with ROM.  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  - no heparin products.  - SCDs    Alcohol use- (present on admission)  Assessment & Plan  *Pt reports a h/o EtOH use.  Denies drinking reporting that he quit 1.5 years ago.  No need to initiate CIWA protocol especially in the setting of acute on chronic hypoxic respiratory failure.  If patient later shows withdrawal symptoms, will consider CIWA but only if SpO2 WNL.  However, supplementing with vitamins would benefit patient especially if patient happens to have multiple vitamin deficiencies.  - multipe vitamins  - folic acid  - thaimine         Core Measures:  PIV: refuses pIV, midline placement.  Code Status: DNAR/DNI  Abd: 750 mg Levoquin daily (renally dosed)  DVT PPx: SCDs  GI PPx: Bowel Prep  Disposition: Hospital day 6.

## 2022-02-04 NOTE — PROGRESS NOTES
MD to bedside, full neuro assessment complete. No orders at this time. WCM. Hourly rounding in place.

## 2022-02-04 NOTE — DISCHARGE PLANNING
Received Choice form at 1415  Agency/Facility Name: [facility]  Referral sent per Choice form @ 1415     Agency/Facility Name: [facility]  Spoke To: Farheen  Outcome: No hospice beds at this time.

## 2022-02-04 NOTE — CARE PLAN
The patient is Stable - Low risk of patient condition declining or worsening    Shift Goals  Clinical Goals: Safety  Patient Goals: TRISTON    Progress made toward(s) clinical / shift goals:      Problem: Knowledge Deficit - Standard  Goal: Patient and family/care givers will demonstrate understanding of plan of care, disease process/condition, diagnostic tests and medications  Outcome: Progressing  Note: Educated patient on POC, medication regimen, assessments, ambulation, safety, interventions in place to maintain/ improve skin integrity, rest, diet. Will continue to educate patient on POC accordingly.     Problem: Fall Risk  Goal: Patient will remain free from falls  Outcome: Progressing  Note: Appropriate fall precautions in place.     Problem: Pain - Standard  Goal: Alleviation of pain or a reduction in pain to the patient’s comfort goal  Outcome: Progressing  Flowsheets (Taken 2/3/2022 2025)  Non Verbal Scale: Calm  Note: Provided extra pillows and blankets. Will continue to assess and treat accordingly.

## 2022-02-04 NOTE — THERAPY
Contact Note     Patient Name: Osmar Medina  Age:  81 y.o., Sex:  male  Medical Record #: [id number]  Today's Date: 2/4/2022    Patient will not be actively followed for physical therapy services at this time, however may be seen if requested by physician for 1 more visit within 30 days to address any discharge or equipment needs       02/04/22 1300   Interdisciplinary Plan of Care Collaboration   Collaboration Comments Per chart, plan is for pt to DC to SNF on hospice. Acute PT services no longer indicated. Please re-order if change in plan of care.

## 2022-02-04 NOTE — DISCHARGE PLANNING
Anticipated Discharge Disposition: SNF with hospice    Action: Discussed pt in IDT rounds. Pt medically clear to DC to SNF with hospice, however no accepting SNF facilities. LSW made phone call to Priscilla with the [phone]. Priscilla confirmed that [place], [place], and [facility] the only facilities [facility].    LSW completed chart review. Pt has Medicare, VA, and Medicaid coverage. LSW faxed blanket SNF choice to [facility]ION[facility] and requested that facilities be notified that pt also has Medicaid, since it doesn't show on the face sheet.    Barriers to Discharge: SNF acceptance.    Plan: Care coordination will follow up with SNF referrals.

## 2022-02-05 NOTE — CARE PLAN
The patient is Stable - Low risk of patient condition declining or worsening    Shift Goals  Clinical Goals: Patient will sleep; maintain patient safety; reduce agitation as needed   Patient Goals: TRISTON  Family Goals: TRISTON    Progress made toward(s) clinical / shift goals:      Problem: Respiratory  Goal: Patient will achieve/maintain optimum respiratory ventilation and gas exchange  Outcome: Progressing     Problem: Skin Integrity  Goal: Skin integrity is maintained or improved  Outcome: Progressing     Problem: Fall Risk  Goal: Patient will remain free from falls  Outcome: Progressing       Patient is not progressing towards the following goals:

## 2022-02-05 NOTE — DISCHARGE PLANNING
Anticipated Discharge Disposition: SNF with hospice    Action: Per chart review, no accepting SNFs at this time.     Barriers to Discharge: pending accepting SNF for hospice    Plan: cont to f/u for DC needs

## 2022-02-05 NOTE — PROGRESS NOTES
Handoff report received from day shift nurse. Patient care assumed. Patient is currently resting in bed. Plan of care discussed with the patient and the patient verbalizes no questions at this time. Patient is A&O x2 oriented to person and time, on 5L nasal cannula, patient is medical, and vital signs are stable. Patient denies any pain and appears to be resting comfortably at this time. at this time. Call light and belongings within reach, bed in lowest and locked position, and patient educated on the use of call light. Will continue plan of care.

## 2022-02-05 NOTE — PROGRESS NOTES
Daily Progress Note:     Date of Service: 2/4/2022  Primary Team: [employer] [facility] Blue Team   Attending: XAVIER Padilla M.D.   Senior Resident: Dr. KASSANDRA Barry MD  Intern: Dr. JAMAL Dey MD  Contact:  [phone]    Chief Complaint:   R. 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 Influenza bacteremia, admitted for acute hypoxic respiratory failure    Subjective:  Pt did not sleep overnight, was aggressive towrds staff in the afternoon. Per ancillary staff, mentation and orientation is fluctuant throughout the day            Consultants/Specialty:  Palliative consult    Review of Systems:   Review of Systems   Constitutional: Negative for chills and fever.   HENT: Negative for hearing loss, sinus pain and tinnitus.    Eyes: Negative for blurred vision and double vision.   Respiratory: Positive for cough, sputum production and shortness of breath. Negative for hemoptysis.    Cardiovascular: Negative for chest pain, palpitations and orthopnea.   Gastrointestinal: Negative for heartburn and nausea.   Genitourinary: Negative for dysuria and urgency.   Musculoskeletal: Negative for myalgias and neck pain.   Skin: Negative for itching and rash.   Neurological: Negative for dizziness, tingling and headaches.   Endo/Heme/Allergies: Negative for environmental allergies. Does not bruise/bleed easily.   Psychiatric/Behavioral: Negative for depression and suicidal ideas.       Objective Data:   Physical Exam:   Vitals:   Temp:  [36.6 °C (97.9 °F)-36.8 °C (98.2 °F)] 36.7 °C (98 °F)  Pulse:  [64-90] 90  Resp:  [16-18] 18  BP: (104-130)/(60-73) 104/70  SpO2:  [92 %-94 %] 93 %    General: Frail elderly man In no acute distress  HEENT: NC/AT.  PERRLA, EOMI.  External ear  normal.  Nares patent, nonedematous nonerythematous.  Moist mucous membranes.  Trachea midline.   Neck: No JVP.  Carotid bruit could not be appreciated.  Nontender, nonnodular thyroid.  No anterior or posterior cervical, occipital, supraclavicular lymphadenopathy  Cardiovascular: PMI<2 cm at fifth costal space at midclavicular line.  No heaves appreciated.  No thrills.  RRR W/O M, R, G.  Pulmonary: Normal work of breathing.  Resonant to percussion.  Crackles over RML field, otherwise CTAB,   Abdomen: Flat, soft, nondistended.  Normoactive bowel sounds.  Nontender to percussion or palpation.  No hepatosplenomegaly noted.  No fluid wave  Musculoskeletal: Normal tone reduced bulk.  Full ROM.  Skin: Warm and dry.  No rashes, bruises or lacerations noted  Neuro: Alert and oriented X4.  CN II-XII checked and intact.  5 out of 5 strength throughout.  DTR 2+ throughout.  FTN, HTS intact.  Sensation intact . negative Romberg.  Lymphatic: No edema or lymphadenopathy noted.  Psychiatric: Good mood congruent affect.  Thought form linear, content appropriate    Labs:   Recent Labs     02/04/22  0841   WBC 13.1*   RBC 3.72*   HEMOGLOBIN 11.9*   HEMATOCRIT 37.0*   MCV 99.5*   MCH 32.0   RDW 53.0*   PLATELETCT 302   MPV 11.3   NEUTSPOLYS 79.30*   LYMPHOCYTES 11.30*   MONOCYTES 6.70   EOSINOPHILS 0.30   BASOPHILS 0.40     Recent Labs     02/04/22  0841   SODIUM 153*   POTASSIUM 3.3*   CHLORIDE 120*   CO2 20   GLUCOSE 115*   BUN 25*     BCx 1/28 : H. Influenza 2/2  BCx 1/29: NGTD    Imaging:     No new imaging.    Assessment and plan:  Mr Medina is an 81 year old man with PMHx remarkable for CAD complicated by inferior MI 2013 (referred for CABG, did not proceed), HFrEF (EF 25-30% 1/29/22), COPD, CVA with residual dysphagia, HIT and non compliance who presented with R sided chest pain found to have RML PNA and H. Influenza bacteremia (now resolved), admitted for acute hypoxic respiratory failure, now resolved. Awaiting placement at  SNF with palliative approach to care.      * Acute on chronic respiratory failure (HCC)- (present on admission) - Resolved  Assessment & Plan  *On RA  - Levoquin daily until 2/8/22  - pt refuses blood draws. Unable to repeat CBCs  - continue albuterol PRN  - continue supplemental O2 titrated 88-92%               Delirium   Waxing and waning mental status               -minimize night time interruption   -One time seroquel 50 mg during night time                        to aid sleep    Pneumonia- (present on admission)  Assessment & Plan             * Likely H influenza PNA, see BCx above    - Levoquin 750 mg daily until 2/8/22  - continue albuterol PRN  - continue supplemental O2 titrated 88-92%    Sepsis (HCC)- (present on admission) - Resolved  Assessment & Plan  Assessment & Plan  This is Sepsis Present on admission  SIRS criteria identified on my evaluation include: Tachycardia, with heart rate greater than 90 BPM, Tachypnea, with respirations greater than 20 per minute and Leukocytosis, with WBC greater than 12,000  Source is pulmonary  Sepsis protocol initiated  Pt has HFrEF with significant BLE edema.  Fluid resuscitation with caution.  IV antibiotics as appropriate for source of sepsis  While organ dysfunction may be noted elsewhere in this problem list or in the chart, degree of organ dysfunction does not meet CMS criteria for severe sepsis      Gram-negative bacteremia- (present on admission) - Resolved  Assessment & Plan    - Levoquin daily until 2/8/22  -1/29 BCx NGTD    NSTEMI (non-ST elevated myocardial infarction) (HCC)- (present on admission)  Assessment & Plan  *Upon presentation to ED. Pt complained of 2-day history of chest pain which resolved during pt encounter on floor. Troponins elevated consistently.  EKG show new left anterior fascicular block and ST depression noted in anterior leads.  Patient has a history of CAD and prior inferior MI with no stents place; referred for CABG but did not  F/U.  *Cardiology recommends medical treatment   *ECHO 1/29  EF 25-30% and findings consistent with ischemic cardiomyopathy.   *Etiology 2/2 to demand ischemia due to sepsis and acute hypoxic respiratory failure, see above  *treat underlying cause.  - 81 mg aspirin  - simvistatin  - imdur  - lisinopril  - carvedilol    Encounter for hospice care discussion  Assessment & Plan  *Hospice referral placed 2/1/22.   and  are coordinating placement into [facility] to arrange hospice care.  Patient is agreeable to SNF placement and hospice care.  -[facility]    Nausea  Assessment & Plan  *pt complained of nausea.  No vomiting. Resolved 2/2/22.  - transdermal scopalamine patch.    Right hip pain- (present on admission)  Assessment & Plan   *pt complains of R hip pain for the past 2 months.  On exam, TTP R hip.  Pain worse with ROM.  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  - no heparin products.  - SCDs    Alcohol use- (present on admission)  Assessment & Plan  *Pt reports a h/o EtOH use.  Denies drinking reporting that he quit 1.5 years ago. No evidence of withdrawals  - multipe vitamins  - folic acid  - thaimine         Core Measures:  PIV: none, pt declines  Code Status: DNAR/DNI  Abd: 750 mg Levoquin daily (renally dosed)  DVT PPx: SCDs  GI PPx: per protocol  Disposition: pending placement to SNF with palliative care..

## 2022-02-06 PROBLEM — I25.10 CORONARY ARTERY DISEASE: Status: ACTIVE | Noted: 2022-01-01

## 2022-02-06 PROBLEM — I50.20 HFREF (HEART FAILURE WITH REDUCED EJECTION FRACTION) (HCC): Status: ACTIVE | Noted: 2022-01-01

## 2022-02-06 NOTE — CARE PLAN
The patient is Stable - Low risk of patient condition declining or worsening    Shift Goals  Clinical Goals: Patient will sleep; maintain patient safety; reduce agitation as needed   Patient Goals: TRISTON  Family Goals: TRISTON    Problem: Skin Integrity  Goal: Skin integrity is maintained or improved  Outcome: Progressing     Problem: Fall Risk  Goal: Patient will remain free from falls  Outcome: Progressing     Patient is not progressing towards the following goals:

## 2022-02-06 NOTE — PROGRESS NOTES
Daily Progress Note:     Date of Service: 2/5/2022  Primary Team: [employer] [facility] Team   Attending: XAVIER Padilla M.D.   Senior Resident: Dr. KASSANDRA Barry MD  Intern: Dr. JAMAL Dey MD  Contact:  [phone]    Chief Complaint:   R. 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 Influenza bacteremia, admitted for acute hypoxic respiratory failure    Subjective:  No acute overnight events.  Pt states that his symptoms have improved.    Interval History:  Notified by RN that was confused and slurring speech.  Upon reassessment, pt was A&Ox4.  facial symmetry unchanged. With encouragement pt had 4/5 bilateral  strength.  4/5 dorsiflexion bilaterally. 4/5 plantar flexion bilaterally.            Consultants/Specialty:  Palliative consult    Review of Systems:   Review of Systems   Constitutional: Negative for chills and fever.   HENT: Negative for hearing loss, sinus pain and tinnitus.    Eyes: Negative for blurred vision and double vision.   Respiratory: Positive for cough, sputum production and shortness of breath. Negative for hemoptysis.    Cardiovascular: Negative for chest pain, palpitations and orthopnea.   Gastrointestinal: Negative for heartburn and nausea.   Genitourinary: Negative for dysuria and urgency.   Musculoskeletal: Negative for myalgias and neck pain.   Skin: Negative for itching and rash.   Neurological: Negative for dizziness, tingling and headaches.   Endo/Heme/Allergies: Negative for environmental allergies. Does not bruise/bleed easily.   Psychiatric/Behavioral: Negative for depression and suicidal ideas.       Objective Data:   Physical Exam:   Vitals:   Temp:  [35.7 °C (96.3 °F)-36.7 °C (98 °F)] 36.1 °C (97 °F)  Pulse:  [72-90]  85  Resp:  [18-24] 24  BP: (104-125)/(54-72) 113/71  SpO2:  [84 %-100 %] 98 %    General: Frail elderly man In no acute distress  HEENT: NC/AT.  PERRLA, EOMI.  External ear normal.  Nares patent, nonedematous nonerythematous.  Moist mucous membranes.  Trachea midline.   Neck: No JVP.  Carotid bruit could not be appreciated.  Nontender, nonnodular thyroid.  No anterior or posterior cervical, occipital, supraclavicular lymphadenopathy  Cardiovascular: PMI<2 cm at fifth costal space at midclavicular line.  No heaves appreciated.  No thrills.  RRR W/O M, R, G.  Pulmonary: Normal work of breathing.  Resonant to percussion.  Crackles over RML field, otherwise CTAB,   Abdomen: Flat, soft, nondistended.  Normoactive bowel sounds.  Nontender to percussion or palpation.  No hepatosplenomegaly noted.  No fluid wave  Musculoskeletal: Normal tone reduced bulk.  Full ROM.  Skin: Warm and dry.  No rashes, bruises or lacerations noted  Neuro: Alert and oriented X4.  CN II-XII checked and intact.  5 out of 5 strength throughout.  DTR 2+ throughout.  FTN, HTS intact.  Sensation intact . negative Romberg.  Lymphatic: No edema or lymphadenopathy noted.  Psychiatric: Good mood congruent affect.  Thought form linear, content appropriate    Labs:   Recent Labs     02/04/22  0841   WBC 13.1*   RBC 3.72*   HEMOGLOBIN 11.9*   HEMATOCRIT 37.0*   MCV 99.5*   MCH 32.0   RDW 53.0*   PLATELETCT 302   MPV 11.3   NEUTSPOLYS 79.30*   LYMPHOCYTES 11.30*   MONOCYTES 6.70   EOSINOPHILS 0.30   BASOPHILS 0.40     Recent Labs     02/04/22  0841 02/05/22  0914   SODIUM 153*  --    POTASSIUM 3.3* 3.6   CHLORIDE 120*  --    CO2 20  --    GLUCOSE 115*  --    BUN 25*  --      BCx 1/28 : H. Influenza 2/2  BCx 1/29: NGTD    Imaging:     No new imaging.    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 Influenza bacteremia (now resolved), admitted for acute hypoxic respiratory failure, now resolved. Awaiting placement at SNF with palliative approach to care.      * Acute on chronic respiratory failure (HCC)- (present on admission) - Resolved  Assessment & Plan  *On RA  - Levoquin daily until 2/8/22  - pt refuses blood draws. Unable to repeat CBCs  - continue albuterol PRN  - continue supplemental O2 titrated 88-92%               Delirium   Waxing and waning mental status               -minimize night time interruption   -One time seroquel 50 mg during night time to aid sleep    Pneumonia- (present on admission)  Assessment & Plan             * Likely H influenza PNA, see BCx above    - Levoquin 750 mg daily until 2/8/22  - continue albuterol PRN  - continue supplemental O2 titrated 88-92%    Sepsis (HCC)- (present on admission) - Resolved  Assessment & Plan  Assessment & Plan  This is Sepsis Present on admission  SIRS criteria identified on my evaluation include: Tachycardia, with heart rate greater than 90 BPM, Tachypnea, with respirations greater than 20 per minute and Leukocytosis, with WBC greater than 12,000  Source is pulmonary  Sepsis protocol initiated  Pt has HFrEF with significant BLE edema.  Fluid resuscitation with caution.  IV antibiotics as appropriate for source of sepsis  While organ dysfunction may be noted elsewhere in this problem list or in the chart, degree of organ dysfunction does not meet CMS criteria for severe sepsis      Gram-negative bacteremia- (present on admission) - Resolved  Assessment & Plan    - Levoquin daily until 2/8/22  -1/29 BCx NGTD    NSTEMI (non-ST elevated myocardial infarction) ([facility])- (present on admission)  Assessment & Plan  *Upon presentation to ED. Pt complained of 2-day history of chest pain which resolved during pt encounter on floor. Troponins elevated  consistently.  EKG show new left anterior fascicular block and ST depression noted in anterior leads.  Patient has a history of CAD and prior inferior MI with no stents place; referred for CABG but did not F/U.  *Cardiology recommends medical treatment   *ECHO 1/29  EF 25-30% and findings consistent with ischemic cardiomyopathy.   *Etiology 2/2 to demand ischemia due to sepsis and acute hypoxic respiratory failure, see above  *treat underlying cause.  - 81 mg aspirin  - simvistatin  - imdur  - lisinopril  - carvedilol    Encounter for hospice care discussion  Assessment & Plan  *Hospice referral placed 2/1/22.   and  are coordinating placement into [facility] to arrange hospice care.  Patient is agreeable to SNF placement and hospice care.  -[facility]    Nausea  Assessment & Plan  *pt complained of nausea.  No vomiting. Resolved 2/2/22.  - transdermal scopalamine patch.    Right hip pain- (present on admission)  Assessment & Plan   *pt complains of R hip pain for the past 2 months.  On exam, TTP R hip.  Pain worse with ROM.  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  - no heparin products.  - SCDs    Alcohol use- (present on admission)  Assessment & Plan  *Pt reports a h/o EtOH use.  Denies drinking reporting that he quit 1.5 years ago. No evidence of withdrawals  - multipe vitamins  - folic acid  - thaimine         Core Measures:  PIV: none, pt declines  Code Status: DNAR/DNI  Abd: 750 mg Levoquin daily (renally dosed)  DVT PPx: SCDs  GI PPx: per protocol  Disposition: pending placement to SNF with palliative care..

## 2022-02-06 NOTE — CARE PLAN
The patient is Stable - Low risk of patient condition declining or worsening    Shift Goals  Clinical Goals: Safety, monitor neuro status, monitor BP  Patient Goals: TRISTON  Family Goals: TRISTON    Progress made toward(s) clinical / shift goals:  Patient A&Ox3. Patient hard to understand at times, patient has extremely slurred speech. Patient gets agitated and frustrated when staff does not understand. Educated patient on safety and monitoring blood pressure. Patient showed understanding of POC. Patient Q2hr turns to help maintain skin integrity. Patient maintain O2 saturation above 90%. Will continue to monitor and check BP.    Problem: Respiratory  Goal: Patient will achieve/maintain optimum respiratory ventilation and gas exchange  Outcome: Progressing     Problem: Skin Integrity  Goal: Skin integrity is maintained or improved  Outcome: Progressing    Patient is not progressing towards the following goals:    Problem: Self Care  Goal: Patient will have the ability to perform ADLs independently or with assistance (bathe, groom, dress, toilet and feed)  Outcome: Not Progressing   Patient as rigid extremities and gets agitated and does not participate in care,  Problem: Discharge Planning - Pneumonia  Goal: Patient will verbalize understanding of lifestyle changes and therapeutic needs post discharge  Outcome: Not Progressing  Patient asking for alcohol, patient confused.

## 2022-02-06 NOTE — PROGRESS NOTES
Assumed care of PT A&O 3. Pt resting in bed with no signs of labored breathing. Call light within reach, bed in lowest position, upper bed rails up. Pt was updated on plan of care for the day.

## 2022-02-06 NOTE — PROGRESS NOTES
Daily Progress Note:     Date of Service: 2/6/2022  Primary Team: [facility] IM Blue Team   Attending: XAVIER Padilla M.D.   Senior Resident: Dr. KASSANDRA Barry MD  Intern: Dr. JAMAL Dey MD  Contact:  [phone]    Chief Complaint:   R. 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 Influenza bacteremia, admitted for acute hypoxic respiratory failure.    Subjective:   Pt had one low BP reading overnight of 84/58, but no acute overnight events.  Pt complained of dry mouth, but otherwise SOB has improved.  Denied fever, chills, CP, palpitations, cough, SOB, abd pain, N/V/D, syncope.    Interval History:  Pending SNF placement.      Consultants/Specialty:  Palliative consult    Review of Systems:   Review of Systems   Constitutional: Negative for chills and fever.   HENT: Negative for hearing loss, sinus pain and tinnitus.    Eyes: Negative for blurred vision and double vision.   Respiratory: Positive for cough, sputum production and shortness of breath. Negative for hemoptysis.    Cardiovascular: Negative for chest pain, palpitations and orthopnea.   Gastrointestinal: Negative for heartburn and nausea.   Genitourinary: Negative for dysuria and urgency.   Musculoskeletal: Negative for myalgias and neck pain.   Skin: Negative for itching and rash.   Neurological: Negative for dizziness, tingling and headaches.   Endo/Heme/Allergies: Negative for environmental allergies. Does not bruise/bleed easily.   Psychiatric/Behavioral: Negative for depression and suicidal ideas.       Objective Data:   Physical Exam:   Vitals:   Temp:  [36.1 °C (96.9 °F)-36.6 °C (97.8 °F)] 36.6 °C (97.8 °F)  Pulse:  [63-95] 95  Resp:  [18-24] 18  BP: ()/(49-71) 109/54  SpO2:  [91 %-98 %]  92 %    General: Frail elderly man In no acute distress  HEENT: NC/AT.  PERRLA, EOMI.  External ear normal.  Nares patent, nonedematous nonerythematous.  DMM.  Trachea midline.   Neck: No JVP.  Carotid bruit could not be appreciated.  Nontender, nonnodular thyroid.  No anterior or posterior cervical, occipital, supraclavicular lymphadenopathy  Cardiovascular: PMI<2 cm at fifth costal space at midclavicular line.  No heaves appreciated.  No thrills.  RRR W/O M, R, G.  Pulmonary: Normal work of breathing.  Resonant to percussion.  Crackles over RML field, otherwise CTAB.   Abdomen: Flat, soft, nondistended.  Normoactive bowel sounds.  Nontender to percussion or palpation.  No hepatosplenomegaly noted.  No fluid wave  Musculoskeletal: Normal tone reduced bulk.  Full ROM.  Skin: Warm and dry.  No rashes, bruises or lacerations noted  Neuro: Alert and oriented X4.  CN II-XII checked and intact.  5 out of 5 strength throughout.  DTR 2+ throughout.  FTN, HTS intact.  Sensation intact . negative Romberg.  Lymphatic: No edema or lymphadenopathy noted.  Psychiatric: Good mood congruent affect.  Thought form linear, content appropriate    Labs:   Recent Labs     02/04/22  0841   WBC 13.1*   RBC 3.72*   HEMOGLOBIN 11.9*   HEMATOCRIT 37.0*   MCV 99.5*   MCH 32.0   RDW 53.0*   PLATELETCT 302   MPV 11.3   NEUTSPOLYS 79.30*   LYMPHOCYTES 11.30*   MONOCYTES 6.70   EOSINOPHILS 0.30   BASOPHILS 0.40     Recent Labs     02/04/22  0841 02/05/22  0914   SODIUM 153*  --    POTASSIUM 3.3* 3.6   CHLORIDE 120*  --    CO2 20  --    GLUCOSE 115*  --    BUN 25*  --      BCx 1/28 : H. Influenza 2/2  BCx 1/29: NGTD    Imaging:     No new imaging.    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 Influenza bacteremia (now resolved), admitted for acute hypoxic respiratory failure, now resolved. Awaiting placement at SNF with palliative approach to care.      * Acute on chronic respiratory failure (HCC)- (present on admission) - Resolved  Assessment & Plan  *On RA  - Levoquin daily until 2/8/22  - pt refuses blood draws. Unable to repeat CBCs  - continue albuterol PRN  - continue supplemental O2 titrated 88-92%               Delirium   Waxing and waning mental status               -minimize night time interruption   -One time seroquel 50 mg during night time to aid sleep    Pneumonia- (present on admission)  Assessment & Plan             * Likely H influenza PNA, see BCx above    - Levoquin 750 mg daily until 2/8/22  - continue albuterol PRN  - continue supplemental O2 titrated 88-92%    Sepsis ([facility])- (present on admission) - Resolved  Assessment & Plan  Assessment & Plan  This is Sepsis Present on admission  SIRS criteria identified on my evaluation include: Tachycardia, with heart rate greater than 90 BPM, Tachypnea, with respirations greater than 20 per minute and Leukocytosis, with WBC greater than 12,000  Source is pulmonary  Sepsis protocol initiated  Pt has HFrEF with significant BLE edema.  Fluid resuscitation with caution.  IV antibiotics as appropriate for source of sepsis  While organ dysfunction may be noted elsewhere in this problem list or in the chart, degree of organ dysfunction does not meet CMS criteria for severe sepsis      Gram-negative bacteremia- (present on admission) - Resolved  Assessment & Plan    - Levoquin daily until 2/8/22  -1/29 BCx NGTD    NSTEMI (non-ST elevated myocardial infarction) ([facility])- (present on admission)  Assessment & Plan  *Upon presentation to ED. Pt complained of 2-day history of chest pain which resolved during pt encounter on floor. Troponins elevated consistently.  EKG show new left anterior fascicular block and ST depression noted in anterior leads.   Patient has a history of CAD and prior inferior MI with no stents place; referred for CABG but did not F/U.  *Cardiology recommends medical treatment   *ECHO 1/29  EF 25-30% and findings consistent with ischemic cardiomyopathy.   *Etiology 2/2 to demand ischemia due to sepsis and acute hypoxic respiratory failure, see above  *treat underlying cause.  - 81 mg aspirin  - simvistatin  - imdur  - lisinopril  - carvedilol    Encounter for hospice care discussion  Assessment & Plan  *Hospice referral placed 2/1/22.   and  are coordinating placement into [facility] to arrange hospice care.  Patient is agreeable to SNF placement and hospice care.  -[facility]    Nausea  Assessment & Plan  *pt complained of nausea.  No vomiting. Resolved 2/2/22.  - transdermal scopalamine patch.    Right hip pain- (present on admission)  Assessment & Plan   *pt complains of R hip pain for the past 2 months.  On exam, TTP R hip.  Pain worse with ROM.  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  - no heparin products.  - SCDs    Alcohol use- (present on admission)  Assessment & Plan  *Pt reports a h/o EtOH use.  Denies drinking reporting that he quit 1.5 years ago. No evidence of withdrawals  - multipe vitamins  - folic acid  - thaimine         Core Measures:  PIV: none, pt declines  Code Status: DNAR/DNI  Abd: 750 mg Levoquin daily until 2/8.  DVT PPx: SCDs  GI PPx: per protocol  Disposition: pending placement to SNF with palliative care..

## 2022-02-07 LAB
BACTERIA BLD CULT: ABNORMAL
BACTERIA BLD CULT: ABNORMAL
SIGNIFICANT IND 70042: ABNORMAL
SITE SITE: ABNORMAL
SOURCE SOURCE: ABNORMAL

## 2022-02-07 NOTE — PROGRESS NOTES
Attempted to contact pt's daughter regarding pt's belonging and to give her NAM contact number for future steps. No answer at this time. Pt's daughter Ilene can be reached at [phone]. Security was called regarding pt's belongings and they said they will  pt's belongings when they  take pt to [facility].

## 2022-02-07 NOTE — PROGRESS NOTES
Assumed care of pt A&Ox 2. Pt resting in bed with no signs of labored breathing. On 5L O2. Call light within reach, bed in lowest position, upper bed rails up. Pt was updated on plan of care for the day.

## 2022-02-07 NOTE — ASSESSMENT & PLAN NOTE
*history of inferior MI in 2013.  EKG showed no signs of ST or T wave abnormalities during admission.  Noted to be elevated and ranging between 109-116.    -  carvedilol  - imdur  - lisinopril  - simvistatin

## 2022-02-07 NOTE — ASSESSMENT & PLAN NOTE
*ECHO from 1/29 showed EF 25-30% noted to be moderate to severely reduced.  Hypokinesis to akinesis of the basal to mid inferolateral/inferior, basal inferior septum, basal lateral walls.  Reduced right ventricular systolic function.  Mild to moderate aortic insufficiency.     - carvedilol  - lisinopril

## 2022-02-07 NOTE — PROGRESS NOTES
CNA walked in pt's room to get vital signs and pt was pulseless and unresponsive. Pt is DNAR/DNI so no resuscitation was performed. MD came to bedside and time of death was pronounced at 1643. Prior to finding pt unresponsive hourly rounding was performed and pt was moving around in bed. MD working with  to find next of kin so they can be notified.

## 2022-02-07 NOTE — DISCHARGE PLANNING
Located pt's daughter Ilene Whitten. Good phone number for her is [phone]. Provided phone number to [employer] Resident Jesika Coulter via voalte to notify pt's daughter and NOK.

## 2022-02-07 NOTE — DISCHARGE SUMMARY
Death Summary    Cause of Death  Cardiac arrest in the setting of coronary artery disease and heart failure with reduced ejection failure as well as history of cerebral vascular accident with residual dysphagia complicated by repeated aspirations.    Comorbid Conditions at the Time of Death  Principal Problem:    Acute on chronic respiratory failure (HCC) POA: Yes  Active Problems:    Pneumonia POA: Yes    Sepsis ([facility]) POA: Yes    NSTEMI (non-ST elevated myocardial infarction) ([facility]) POA: Yes    Gram-negative bacteremia POA: Yes    Coronary artery disease POA: Unknown    HFrEF (heart failure with reduced ejection fraction) ([facility]) POA: Unknown    Alcohol use POA: Yes    HIT (heparin-induced thrombocytopenia) ([facility]) POA: No    Right hip pain (Chronic) POA: Yes    Nausea POA: No    Encounter for hospice care discussion POA: No  Resolved Problems:    * No resolved hospital problems. *      History of Presenting Illness and Hospital Course  This is a 81 y.o. male admitted on 1/28/2022 presenting to [facility] Emergency Department with right-sided chest pain for two days with associated symptom of shortness of breath onset two weeks prior to presentation and admitted for acute on chronic respiratory failure secondary to right middle lung pneumonia complicated by sepsis.    In the emergency department, elevated WBC of 14.6 noted with ANC 12.69.  Troponins were also noted to be elevated and trended 106, 109, 101, and 116 respectively.  Electrocardiogram showed ST depression in the anterior leads.  NT-proBNP 15,652.  Procalcitonin 5.47.  Chest X-ray consolidation of the right lung base, bilateral intersttial infiltrates, small right pleural effusion, and cardiomegaly.  Echocardiogram showed left ventricular ejection fraction of 25-30% with findings consistent with ischemic cardiomyopathy including hypokinesis to akinesis of the basal to mid inferolateral/inferior, basal inferior septum, basal lateral walls.  Some walls  appear thin and scarred was also noted on echocardiogram.  Patient was empirically treated against community acquired pneumonia with ceftriaxone and azithromycin which was change to unasyn.  Blood cultures were collected.      On 1/29, patient was reassessed and noted to be short of breath with cough productive of green sputum saturating well on 10 LPM oxymask (not on home O2 prior to admission).  Patient also noted to have slurred speech.  CT head did not show any acute intracranial abnormalities.  Patient was kept NPO and speech pathology was consulted.  Patient was also noted to ST depression in the anterior leads on electrocardiogram.  On chart review, patient was diagnosed with multi-vessel coronary arterial disease back in 2013.  Coronary arterial bypass graft back was recommended but the patient declined.    Cardiology was consulted and recommended medical management only given that abnormal troponins were likely due to type II demand ischemia in the setting of sepsis pneumonia and underlying coronary artery disease.  Patient's was treated medically with aspirin, simvistatin, coreg, imdur, and lisinopril.  Blood cultures resulted positive for gram negative rods changing the antibiotics and treated with cefepime/doxycyine to cover for potential pseudomonas infection.  On 1/30, blood cultures were positive for H. influenzae and antibiotic treatment was changed to IV unasyn.  Overnight on 1/30, RN notified night team that patient's peripheral intravenous line had infiltrated and that patient was refusing any further peripheral IV placement or blood draws.  Performed goals of care discussion with patient who was agreeable to hospice care.  Patient was agreeable to oral levoquin until 2/8/2022.  Patient clinically improved with antibiotic treatment.    [facility] but [facility] was not successful.  Hospice referral was placed through [facility]  nursing facility placement.         On , ancillary staff found patient unresponsive and without a pulse.  A rapid response was called.  Dr. Barry arrived at bedside, confirmed the patient to be , and pronounced time of death at 1643.